# Patient Record
Sex: MALE | Race: WHITE | NOT HISPANIC OR LATINO | Employment: OTHER | URBAN - METROPOLITAN AREA
[De-identification: names, ages, dates, MRNs, and addresses within clinical notes are randomized per-mention and may not be internally consistent; named-entity substitution may affect disease eponyms.]

---

## 2017-04-17 ENCOUNTER — TRANSFERRED RECORDS (OUTPATIENT)
Dept: HEALTH INFORMATION MANAGEMENT | Facility: CLINIC | Age: 77
End: 2017-04-17

## 2017-04-19 ENCOUNTER — TRANSFERRED RECORDS (OUTPATIENT)
Dept: HEALTH INFORMATION MANAGEMENT | Facility: CLINIC | Age: 77
End: 2017-04-19

## 2017-05-08 DIAGNOSIS — D33.3 ACOUSTIC NEUROMA (H): Primary | ICD-10-CM

## 2017-05-09 ENCOUNTER — OFFICE VISIT (OUTPATIENT)
Dept: OTOLARYNGOLOGY | Facility: CLINIC | Age: 77
End: 2017-05-09

## 2017-05-09 ENCOUNTER — OFFICE VISIT (OUTPATIENT)
Dept: AUDIOLOGY | Facility: CLINIC | Age: 77
End: 2017-05-09
Attending: OTOLARYNGOLOGY

## 2017-05-09 VITALS — HEIGHT: 70 IN | BODY MASS INDEX: 28.77 KG/M2 | WEIGHT: 201 LBS

## 2017-05-09 DIAGNOSIS — D33.3 ACOUSTIC NEUROMA (H): Primary | ICD-10-CM

## 2017-05-09 DIAGNOSIS — D33.3 UNILATERAL VESTIBULAR SCHWANNOMA (H): Primary | ICD-10-CM

## 2017-05-09 DIAGNOSIS — H90.3 SENSORINEURAL HEARING LOSS, BILATERAL: Primary | ICD-10-CM

## 2017-05-09 RX ORDER — PREDNISONE 10 MG/1
TABLET ORAL
COMMUNITY
Start: 2017-03-27 | End: 2023-04-25

## 2017-05-09 RX ORDER — FLUTICASONE PROPIONATE 50 MCG
1 SPRAY, SUSPENSION (ML) NASAL
COMMUNITY
Start: 2016-02-03 | End: 2023-04-25

## 2017-05-09 RX ORDER — LISINOPRIL 5 MG/1
15 TABLET ORAL
COMMUNITY
Start: 2016-12-02 | End: 2023-04-25

## 2017-05-09 ASSESSMENT — PAIN SCALES - GENERAL: PAINLEVEL: NO PAIN (0)

## 2017-05-09 NOTE — PROGRESS NOTES
AUDIOLOGY REPORT    SUMMARY: Audiology visit completed. See audiogram for results.      RECOMMENDATIONS: Follow-up with ENT.    Christopher Seals  Licensed Audiologist  [place] License #[id number]

## 2017-05-09 NOTE — PATIENT INSTRUCTIONS
-Follow up in 1 year with MRI, audiogram and appointment with Dr. Starks and Dr. Becerra [facility].  -You have been provided clearance for a Bi-CROS hearing aid. Please schedule hearing aid consultation with our audiology team to further discuss.

## 2017-05-09 NOTE — MR AVS SNAPSHOT
After Visit Summary   5/9/2017    Ada Colvin    MRN: [id number]           Patient Information     Date Of Birth          1940        Visit Information        Provider Department      5/9/2017 2:35 PM Vonda Starks MD [facility]        Today's Diagnoses     Acoustic neuroma (H)    -  1      Care Instructions    -Follow up in 1 year with MRI, audiogram and appointment with Dr. Starks and Dr. Becerra in [facility].  -You have been provided clearance for a Bi-CROS hearing aid. Please schedule hearing aid consultation with our audiology team to further discuss.        Follow-ups after your visit        Additional Services     AUDIOLOGY ADULT REFERRAL       Your provider has referred you to:  audiology    Treatment:  Evaluation & Treatment  Specialty Testing:  Hearing Aid Consultation-Clearance for Bi-CROS hearing aids    Please be aware that coverage of these services is subject to the terms and limitations of your health insurance plan.  Call member services at your health plan with any benefit or coverage questions.      Please bring the following to your appointment:  >>   Any x-rays, CTs or MRIs which have been performed.  Contact the facility where they were done to arrange for  prior to your scheduled appointment.  Any new CT, MRI or other procedures ordered by your specialist must be performed at a [facility] facility or coordinated by your clinic's referral office.   >>   List of current medications  >>   This referral request   >>   Any documents/labs given to you for this referral                  Who to contact     Please call your clinic at [phone] to:    Ask questions about your health    Make or cancel appointments    Discuss your medicines    Learn about your test results    Speak to your doctor   If you have compliments or concerns about an experience at your clinic, or if you wish to file a complaint, please contact [facility]  "[place] [facility] Patient Relations at [phone] or email us at [email or web address]         Additional Information About Your Visit        MyChart Information     [facility] is an electronic gateway that provides easy, online access to your medical records. With [facility], you can request a clinic appointment, read your test results, renew a prescription or communicate with your care team.     To sign up for [facility] visit the website at [email or web address]   You will be asked to enter the access code listed below, as well as some personal information. Please follow the directions to create your username and password.     Your access code is: [email or web address]  Expires: 2017 11:54 AM     Your access code will  in 90 days. If you need help or a new code, please contact your [facility] or call [phone] for assistance.        Care EveryWhere ID     This is your Care EveryWhere ID. This could be used by other organizations to access your [facility] medical records  [id number]        Your Vitals Were     Height BMI (Body Mass Index)                1.778 m (5' 10\") 28.84 kg/m2           Blood Pressure from Last 3 Encounters:   14 134/74   13 (!) 167/95    Weight from Last 3 Encounters:   17 91.2 kg (201 lb)   14 91.4 kg (201 lb 9.6 oz)   13 86.2 kg (190 lb)              We Performed the Following     AUDIOLOGY ADULT REFERRAL        Primary Care Provider    Physician No Ref-Primary       No address on file        Thank you!     Thank you for choosing [facility]  for your care. Our goal is always to provide you with excellent care. Hearing back from our patients is one way we can continue to improve our services. Please take a few minutes to complete the written survey that you may receive in the mail after your visit with us. Thank you!             Your Updated Medication List - Protect others around you: Learn " how to safely use, store and throw away your medicines at www.disposemymeds.org.          This list is accurate as of: 5/9/17 11:59 PM.  Always use your most recent med list.                   Brand Name Dispense Instructions for use    ADVIL PO      Take 200 mg by mouth as needed       * aspirin 325 MG tablet      Take by mouth daily Reported on 5/9/2017       * aspirin  MG EC tablet      Take 325 mg by mouth       Blood Pressure Monitor Kit     1 kit    1 kit 2 times daily       fluticasone 50 MCG/ACT spray    FLONASE     Spray 1 spray in nostril       hydrochlorothiazide 25 MG tablet    HYDRODIURIL    30 tablet    Take 1 tablet (25 mg) by mouth daily       * lisinopril 5 MG tablet    PRINIVIL/ZESTRIL    30 tablet    Take 8 tablets (40 mg) by mouth At Bedtime       * lisinopril 5 MG tablet    PRINIVIL/ZESTRIL     Take 15 mg by mouth       predniSONE 10 MG tablet    DELTASONE         vitamin D3 1000 UNITS Caps      Take 1,000 Units by mouth       * Notice:  This list has 4 medication(s) that are the same as other medications prescribed for you. Read the directions carefully, and ask your doctor or other care provider to review them with you.

## 2017-05-09 NOTE — MR AVS SNAPSHOT
After Visit Summary   2017    Ada Colvin    MRN: [id number]           Patient Information     Date Of Birth          1940        Visit Information        Provider Department      2017 2:30 PM Michi Becerra MD [facility]        Care Instructions                  Follow-ups after your visit        Who to contact     Please call your clinic at [phone] to:    Ask questions about your health    Make or cancel appointments    Discuss your medicines    Learn about your test results    Speak to your doctor   If you have compliments or concerns about an experience at your clinic, or if you wish to file a complaint, please contact [facility] Patient Relations at [phone] or email us at [email or web address]         Additional Information About Your Visit        MyChart Information     [employer] is an electronic gateway that provides easy, online access to your medical records. With [employer], you can request a clinic appointment, read your test results, renew a prescription or communicate with your care team.     To sign up for [employer] visit the website at [email or web address]   You will be asked to enter the access code listed below, as well as some personal information. Please follow the directions to create your username and password.     Your access code is: [email or web address]  Expires: 2017 11:54 AM     Your access code will  in 90 days. If you need help or a new code, please contact your [facility] or call [phone] for assistance.        Care EveryWhere ID     This is your Care EveryWhere ID. This could be used by other organizations to access your [facility] medical records  [id number]         Blood Pressure from Last 3 Encounters:   14 134/74   13 (!) 167/95    Weight from Last 3 Encounters:   17 91.2 kg (201 lb)   14 91.4 kg (201 lb 9.6 oz)    12/25/13 86.2 kg (190 lb)              Today, you had the following     No orders found for display       Primary Care Provider    Physician No Ref-Primary       No address on file        Thank you!     Thank you for choosing [facility]  for your care. Our goal is always to provide you with excellent care. Hearing back from our patients is one way we can continue to improve our services. Please take a few minutes to complete the written survey that you may receive in the mail after your visit with us. Thank you!             Your Updated Medication List - Protect others around you: Learn how to safely use, store and throw away your medicines at www.disposemymeds.org.          This list is accurate as of: 5/9/17  3:10 PM.  Always use your most recent med list.                   Brand Name Dispense Instructions for use    ADVIL PO      Take 200 mg by mouth as needed       * aspirin 325 MG tablet      Take by mouth daily Reported on 5/9/2017       * aspirin  MG EC tablet      Take 325 mg by mouth       Blood Pressure Monitor Kit     1 kit    1 kit 2 times daily       fluticasone 50 MCG/ACT spray    FLONASE     Spray 1 spray in nostril       hydrochlorothiazide 25 MG tablet    HYDRODIURIL    30 tablet    Take 1 tablet (25 mg) by mouth daily       * lisinopril 5 MG tablet    PRINIVIL/ZESTRIL    30 tablet    Take 8 tablets (40 mg) by mouth At Bedtime       * lisinopril 5 MG tablet    PRINIVIL/ZESTRIL     Take 15 mg by mouth       predniSONE 10 MG tablet    DELTASONE         vitamin D3 1000 UNITS Caps      Take 1,000 Units by mouth       * Notice:  This list has 4 medication(s) that are the same as other medications prescribed for you. Read the directions carefully, and ask your doctor or other care provider to review them with you.

## 2017-05-09 NOTE — LETTER
2017       RE: Luan Kang  [address] [place] [address]     Dear Colleague,    Thank you for referring your patient, Luan Kang, to the [facility] TH[facility] at [facility]. Please see a copy of my visit note below.    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  A detailed summary of today's visit has been entered into the record by our fellow, Gaye Escamilla, which I have reviewed, amended and incorporated into my own.      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  He experienced hearing loss in essentially 2 steps and since the second decline, really has not had useful hearing in the left ear.      He is not having significant balance difficulties and plays tennis regularly.  He has hypertension, which is treated, and is otherwise healthy.      His neurologic examination shows no evidence of facial dysfunction or sensory loss.      His MRI shows a lesion that fills the internal auditory canal and extends into the cerebellopontine angle to touch the surface of the brainstem but not distort it significantly.  The lesion is multicystic.      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      RECOMMENDATIONS:  We discussed the nature of vestibular schwannoma and the various options, including watchful waiting, stereotactic radiosurgery and microsurgical excision.  At this point in time, he clearly favors watchful waiting.  We have recommended that he return in one year with an audiogram and scan at that time.         RHYS CORTES MD             D: 2017 15:13   T: 05/10/2017 10:31   MT: DEE      Name:     LUAN KANG   MRN:      [id number]        Account:      [id number]   :      1940           Service Date: 2017      Document: [id number]

## 2017-05-09 NOTE — LETTER
5/9/2017     RE: Ada Colvin  [address] [address]     Dear Colleague,    Thank you for referring your patient, Ada Colvin, to the [facility] EAR NOSE [facility] THROAT at [facility]. Please see a copy of my visit note below.    Dennys Cortez MD   [facility]   [address], [address]   [place]   [address]       Dear Dr. Cortez,    We had the pleasure of seeing Mr. Colvin at the [facility] in consultation at your request for a newly diagnosed left internal auditory canal and cerebellopontine angle lesion.  The patient was seen in conjunction with Dr. Michi Becerra of the Neurosurgery Department.      HISTORY OF PRESENT ILLNESS:  Mr. 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  The patient reports that he initially noted some hearing loss approximately two years ago.  It was gradual in onset.  He developed tinnitus initially and then noticed a decline in his hearing.  He felt that the hearing was down on both sides and over six to eight months ago, he noted that the hearing had gotten worse.  He sought evaluation and in December of 2016 ended up buying bilateral hearing aids.  He felt that his right ear was actually his better hearing ear and did not get much benefit from the hearing aid on the left side.  Earlier this spring, he felt that his hearing was significantly declining on the lefth side.  He sought care with an ear, nose and throat physician and was treated with a course of prednisone which did not improve his loss of hearing.  He was noted to have an asymmetric hearing loss and an MRI was ordered which demonstrated a new lesion that was enhancing in the left internal auditory canal and cerebellopontine angle.  The patient denies any issues with balance or dizziness.   He plays tennis regularly and has not had any difficulty from this perspective.        The patient denies any facial numbness or tingling.  He has no history of chronic headaches or any other complications.      PAST MEDICAL HISTORY:  Hypertension which is currently controlled on his current medications.      PAST SURGICAL HISTORY:  Hip replacement.      MEDICATIONS:  Vitamin D, Flonase, lisinopril.      ALLERGIES:  No known drug allergies.      FAMILY HISTORY:  He denies any significant chronic medical problems in his family.      SOCIAL HISTORY:  The patient previously smoked.  He quit approximately 10 years ago.  He reports some social alcoholic intake.  He currently works as a professor at the [employer].  He is potentially anticipating longterm sometime in the next year.  He is .  He is quite active and plays tennis regularly.      REVIEW OF SYSTEMS:  A 12-point review of systems was performed.  Those positive are indicated in the HPI.  The remainder are negative.      PHYSICAL EXAMINATION:  In general, the patient is well-appearing and in no acute distress.  He is communicating appropriately.  His face is symmetric at rest and with function.  House-Brackmann I out of VI bilaterally.  His extraocular movements are intact.  He has no spontaneous or gaze-evoked nystagmus.  His external ears are normal.  The external auditory canals have bilateral exostosis but a portion of the tympanic membrane is visible and appears normal, intact and without middle ear effusion.  His oral cavity, oropharynx does not demonstrate any oral mucosal lesions.  The palate elevates symmetrically.  His tongue is midline and mobile.  He has normal shoulder shrug.  His facial sensation is intact.  He has normal finger-to-nose testing.  His gait is normal.      AUDIOGRAM:  An audiogram was performed and reviewed with the patient.  This demonstrates on the right ear that he has normal pure tone thresholds that slope to  a moderate sensorineural hearing loss.  He has a speech reception threshold (SRT) of 20 dB and a word recognition score of 96%.  He has normal tympanogram on the right side.  On the left, he has moderately severe rising to mild sloping to severe sensorineural hearing loss with a speech reception threshold (SRT) of 70 dB and a word recognition score of 12%.  His tympanogram is normal.  He has a present right ipsilateral reflex and the remainder are absent.      IMAGING:  MRI of the internal auditory canals was obtained and reviewed with the patient.  This demonstrates an enhancing lesion within the left internal auditory canal extending out into the cerebellopontine angle approximately 1.5 cm with several cystic components within it.  It does not enhance without contrast.  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  There is no contact with or compression of the brainstem.      IMPRESSION AND PLAN:  Mr. 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  We discussed the course of these tumors.  We reviewed with him that these are often benign tumors that grow slowly.  We reviewed with him that often patients present with hearing loss.  The current hearing loss that he has cannot be recovered regardless of the different management options.      We  discussed management options with him including watchful waiting, stereotactic radiosurgery including gamma knife and surgical intervention.  In regards to watchful waiting, we reviewed with him that often we would have patients return annually with a repeat MRI and audiogram with plans to return sooner should he have worsening symptoms of further hearing loss, onset of imbalance or dizziness or severe headache.  We then discussed stereotactic radiosurgery.  We reviewed that patients often have a quick recovery and it is a one day  procedure.  However, the goal of the intervention is not to remove the tumor but rather to prevent any additional growth from this.  We discussed with him that he may have symptoms or side effects, which we detailed, that develop several months after the initial treatment.  We reviewed the surgical intervention with him.  Given the extent of his hearing loss and the size of the lesion, we would recommend a translabyrinthine approach.  We reviewed with him that this would require a several day stay in the hospital as well as a longer recovery from the procedure, and we detailed the steps of the operation and the common and serious risks attendant with it.  He had a number of questions which were answered.  At this point, as hearing loss is his only current symptom, he would like to continue to closely monitor and proceed with watchful waiting.  We will set him up for follow-up with us in one years' time with an MRI and repeat audiogram.  Again, we reviewed with him certain symptoms should they worsen or new ones develop that we would like him come back and return to see us sooner to discuss additional options.      For his hearing, we discussed Bi-CROS and BAHA today, and encouraged him to consider a Bi-CROS trial while he is pursuing watchful waiting (active surveillance) for his schwannoma.    Gaye Escamilla MD   Neurotology Fellow     Thank you for the opportunity to participate in his care.    Sincerely,    Vonda Starks MD  Otology & Neurotology  [facility]      I, Vonda Starks, saw this patient with the resident/fellow and agree with the resident's findings and plan of care as documented in the resident's/fellow's note.         cc: Dennys Cortez MD    [facility]    [address] [place] [address], [address]    [place] [address]      EK/ms

## 2017-05-09 NOTE — PROGRESS NOTES
Dennys Cortez MD   [facility]   [address] [place] [address], [address]   [place] [address]       Dear Dr. Cortez,    We had the pleasure of seeing Mr. 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  The patient was seen in conjunction with Dr. Michi Becerra of the Neurosurgery Department.      HISTORY OF PRESENT ILLNESS:  Mr. 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  The patient reports that he initially noted some hearing loss approximately two years ago.  It was gradual in onset.  He developed tinnitus initially and then noticed a decline in his hearing.  He felt that the hearing was down on both sides and over six to eight months ago, he noted that the hearing had gotten worse.  He sought evaluation and in December of 2016 ended up buying bilateral hearing aids.  He felt that his right ear was actually his better hearing ear and did not get much benefit from the hearing aid on the left side.  Earlier this spring, he felt that his hearing was significantly declining on the lefth side.  He sought care with an ear, nose and throat physician and was treated with a course of prednisone which did not improve his loss of hearing.  He was noted to have an asymmetric hearing loss and an MRI was ordered which demonstrated a new lesion that was enhancing in the left internal auditory canal and cerebellopontine angle.  The patient denies any issues with balance or dizziness.  He plays tennis regularly and has not had any difficulty from this perspective.        The patient denies any facial numbness or tingling.  He has no history of chronic headaches or any other complications.      PAST MEDICAL HISTORY:  Hypertension which is currently controlled on  his current medications.      PAST SURGICAL HISTORY:  Hip replacement.      MEDICATIONS:  Vitamin D, Flonase, lisinopril.      ALLERGIES:  No known drug allergies.      FAMILY HISTORY:  He denies any significant chronic medical problems in his family.      SOCIAL HISTORY:  The patient previously smoked.  He quit approximately 10 years ago.  He reports some social alcoholic intake.  He currently works as a professor at the [employer].  He is potentially anticipating prison sometime in the next year.  He is .  He is quite active and plays tennis regularly.      REVIEW OF SYSTEMS:  A 12-point review of systems was performed.  Those positive are indicated in the HPI.  The remainder are negative.      PHYSICAL EXAMINATION:  In general, the patient is well-appearing and in no acute distress.  He is communicating appropriately.  His face is symmetric at rest and with function.  House-Brackmann I out of VI bilaterally.  His extraocular movements are intact.  He has no spontaneous or gaze-evoked nystagmus.  His external ears are normal.  The external auditory canals have bilateral exostosis but a portion of the tympanic membrane is visible and appears normal, intact and without middle ear effusion.  His oral cavity, oropharynx does not demonstrate any oral mucosal lesions.  The palate elevates symmetrically.  His tongue is midline and mobile.  He has normal shoulder shrug.  His facial sensation is intact.  He has normal finger-to-nose testing.  His gait is normal.      AUDIOGRAM:  An audiogram was performed and reviewed with the patient.  This demonstrates on the right ear that he has normal pure tone thresholds that slope to a moderate sensorineural hearing loss.  He has a speech reception threshold (SRT) of 20 dB and a word recognition score of 96%.  He has normal tympanogram on the right side.  On the left, he has moderately severe rising to mild sloping to severe sensorineural hearing loss with a  speech reception threshold (SRT) of 70 dB and a word recognition score of 12%.  His tympanogram is normal.  He has a present right ipsilateral reflex and the remainder are absent.      IMAGING:  MRI of the internal auditory canals was obtained and reviewed with the patient.  This demonstrates an enhancing lesion within the left internal auditory canal extending out into the cerebellopontine angle approximately 1.5 cm with several cystic components within it.  It does not enhance without contrast.  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  There is no contact with or compression of the brainstem.      IMPRESSION AND PLAN:  Mr. 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  We discussed the course of these tumors.  We reviewed with him that these are often benign tumors that grow slowly.  We reviewed with him that often patients present with hearing loss.  The current hearing loss that he has cannot be recovered regardless of the different management options.      We  discussed management options with him including watchful waiting, stereotactic radiosurgery including gamma knife and surgical intervention.  In regards to watchful waiting, we reviewed with him that often we would have patients return annually with a repeat MRI and audiogram with plans to return sooner should he have worsening symptoms of further hearing loss, onset of imbalance or dizziness or severe headache.  We then discussed stereotactic radiosurgery.  We reviewed that patients often have a quick recovery and it is a one day procedure.  However, the goal of the intervention is not to remove the tumor but rather to prevent any additional growth from this.  We discussed with him that he may have symptoms or side effects, which we detailed, that develop several months after the initial treatment.  We reviewed  the surgical intervention with him.  Given the extent of his hearing loss and the size of the lesion, we would recommend a translabyrinthine approach.  We reviewed with him that this would require a several day stay in the hospital as well as a longer recovery from the procedure, and we detailed the steps of the operation and the common and serious risks attendant with it.  He had a number of questions which were answered.  At this point, as hearing loss is his only current symptom, he would like to continue to closely monitor and proceed with watchful waiting.  We will set him up for follow-up with us in one years' time with an MRI and repeat audiogram.  Again, we reviewed with him certain symptoms should they worsen or new ones develop that we would like him come back and return to see us sooner to discuss additional options.      For his hearing, we discussed Bi-CROS and BAHA today, and encouraged him to consider a Bi-CROS trial while he is pursuing watchful waiting (active surveillance) for his schwannoma.    Gaye Escamilla MD   Neurotology Fellow     Thank you for the opportunity to participate in his care.    Sincerely,    Vonda Starks MD  Otology & Neurotology  [facility]      I, Vonda Starks, saw this patient with the resident/fellow and agree with the resident's findings and plan of care as documented in the resident's/fellow's note.         cc: Dennys Cortez MD    [facility]    [address] [place] [address], [address]    [place]   [address]      EK/ms

## 2017-05-09 NOTE — NURSING NOTE
Chief Complaint   Patient presents with     Consult     new patient     Brittany Cano Medical Assistant

## 2017-05-09 NOTE — MR AVS SNAPSHOT
After Visit Summary   2017    Ada Colvin    MRN: [id number]           Patient Information     Date Of Birth          1940        Visit Information        Provider Department      2017 11:00 AM Mirian Parr, [facility]        Today's Diagnoses     Sensorineural hearing loss, bilateral    -  1       Follow-ups after your visit        Who to contact     Please call your clinic at [phone] to:    Ask questions about your health    Make or cancel appointments    Discuss your medicines    Learn about your test results    Speak to your doctor   If you have compliments or concerns about an experience at your clinic, or if you wish to file a complaint, please contact [facility] Patient Relations at [phone] or email us at [email or web address]         Additional Information About Your Visit        MyChart Information     [employer] is an electronic gateway that provides easy, online access to your medical records. With [employer], you can request a clinic appointment, read your test results, renew a prescription or communicate with your care team.     To sign up for [employer] visit the website at [email or web address]   You will be asked to enter the access code listed below, as well as some personal information. Please follow the directions to create your username and password.     Your access code is: [email or web address]  Expires: 2017 11:54 AM     Your access code will  in 90 days. If you need help or a new code, please contact your [facility] or call [phone] for assistance.        Care EveryWhere ID     This is your Care EveryWhere ID. This could be used by other organizations to access your [facility] medical records  [id number]         Blood Pressure from Last 3 Encounters:   14 134/74   13 (!) 167/95    Weight from Last 3 Encounters:   17 91.2 kg (201 lb)   14 91.4 kg  (201 lb 9.6 oz)   12/25/13 86.2 kg (190 lb)              We Performed the Following     AUDIOGRAM/TYMPANOGRAM - INTERFACE     [facility] Audiometry Thrshld Eval & Speech Recog (40758)     Tymps / Reflex   (03881)        Primary Care Provider    Physician No Ref-Primary       No address on file        Thank you!     Thank you for choosing [facility] AUDIOLOGY  for your care. Our goal is always to provide you with excellent care. Hearing back from our patients is one way we can continue to improve our services. Please take a few minutes to complete the written survey that you may receive in the mail after your visit with us. Thank you!             Your Updated Medication List - Protect others around you: Learn how to safely use, store and throw away your medicines at www.disposemymeds.org.          This list is accurate as of: 5/9/17  3:33 PM.  Always use your most recent med list.                   Brand Name Dispense Instructions for use    ADVIL PO      Take 200 mg by mouth as needed       * aspirin 325 MG tablet      Take by mouth daily Reported on 5/9/2017       * aspirin  MG EC tablet      Take 325 mg by mouth       Blood Pressure Monitor Kit     1 kit    1 kit 2 times daily       fluticasone 50 MCG/ACT spray    FLONASE     Spray 1 spray in nostril       hydrochlorothiazide 25 MG tablet    HYDRODIURIL    30 tablet    Take 1 tablet (25 mg) by mouth daily       * lisinopril 5 MG tablet    PRINIVIL/ZESTRIL    30 tablet    Take 8 tablets (40 mg) by mouth At Bedtime       * lisinopril 5 MG tablet    PRINIVIL/ZESTRIL     Take 15 mg by mouth       predniSONE 10 MG tablet    DELTASONE         vitamin D3 1000 UNITS Caps      Take 1,000 Units by mouth       * Notice:  This list has 4 medication(s) that are the same as other medications prescribed for you. Read the directions carefully, and ask your doctor or other care provider to review them with you.

## 2017-05-10 NOTE — PROGRESS NOTES
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  A detailed summary of today's visit has been entered into the record by our fellow, Gaye Escamilla, which I have reviewed, amended and incorporated into my own.      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  He experienced hearing loss in essentially 2 steps and since the second decline, really has not had useful hearing in the left ear.      He is not having significant balance difficulties and plays tennis regularly.  He has hypertension, which is treated, and is otherwise healthy.      His neurologic examination shows no evidence of facial dysfunction or sensory loss.      His MRI shows a lesion that fills the internal auditory canal and extends into the cerebellopontine angle to touch the surface of the brainstem but not distort it significantly.  The lesion is multicystic.      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      RECOMMENDATIONS:  We discussed the nature of vestibular schwannoma and the various options, including watchful waiting, stereotactic radiosurgery and microsurgical excision.  At this point in time, he clearly favors watchful waiting.  We have recommended that he return in one year with an audiogram and scan at that time.         RHYS CORTES MD             D: 2017 15:13   T: 05/10/2017 10:31   MT: DEE      Name:     LUAN KANG   MRN:      [id number]        Account:      [id number]   :      1940           Service Date: 2017      Document: [id number]

## 2017-05-31 PROBLEM — D33.3 UNILATERAL VESTIBULAR SCHWANNOMA (H): Status: ACTIVE | Noted: 2017-05-31

## 2018-10-02 DIAGNOSIS — D33.3 VESTIBULAR SCHWANNOMA (H): Primary | ICD-10-CM

## 2018-10-19 ENCOUNTER — RADIANT APPOINTMENT (OUTPATIENT)
Dept: MRI IMAGING | Facility: CLINIC | Age: 78
End: 2018-10-19
Attending: OTOLARYNGOLOGY
Payer: COMMERCIAL

## 2018-10-19 DIAGNOSIS — D33.3 VESTIBULAR SCHWANNOMA (H): ICD-10-CM

## 2018-10-19 RX ORDER — GADOBUTROL 604.72 MG/ML
10 INJECTION INTRAVENOUS ONCE
Status: COMPLETED | OUTPATIENT
Start: 2018-10-19 | End: 2018-10-19

## 2018-10-19 RX ADMIN — GADOBUTROL 9 ML: 604.72 INJECTION INTRAVENOUS at 08:41

## 2018-10-19 NOTE — DISCHARGE INSTRUCTIONS
MRI Contrast Discharge Instructions    The IV contrast you received today will pass out of your body in your  urine. This will happen in the next 24 hours. You will not feel this process.  Your urine will not change color.    Drink at least 4 extra glasses of water or juice today (unless your doctor  has restricted your fluids). This reduces the stress on your kidneys.  You may take your regular medicines.    If you are on dialysis: It is best to have dialysis today.    If you have a reaction: Most reactions happen right away. If you have  any new symptoms after leaving the hospital (such as hives or swelling),  call your hospital at the correct number below. Or call your family doctor.  If you have breathing distress or wheezing, call 911.    Special instructions: ***    I have read and understand the above information.    Signature:______________________________________ Date:___________    Staff:__________________________________________ Date:___________     Time:__________    [place] [facility] Departments:    ___[facility]: [phone]  ___[facility]: [phone]  ___[place]: [phone] ___[facility]: [phone]  ___[facility]: [phone]  ___[facility]: [phone]  ___[phone]  ___[facility]: [phone]  ___[phone]

## 2018-10-30 ENCOUNTER — OFFICE VISIT (OUTPATIENT)
Dept: AUDIOLOGY | Facility: CLINIC | Age: 78
End: 2018-10-30
Attending: OTOLARYNGOLOGY
Payer: COMMERCIAL

## 2018-10-30 ENCOUNTER — OFFICE VISIT (OUTPATIENT)
Dept: OTOLARYNGOLOGY | Facility: CLINIC | Age: 78
End: 2018-10-30
Payer: COMMERCIAL

## 2018-10-30 VITALS — WEIGHT: 197 LBS | BODY MASS INDEX: 28.2 KG/M2 | HEIGHT: 70 IN

## 2018-10-30 DIAGNOSIS — H90.3 SENSORY HEARING LOSS, BILATERAL: Primary | ICD-10-CM

## 2018-10-30 DIAGNOSIS — D33.3 VESTIBULAR SCHWANNOMA (H): Primary | ICD-10-CM

## 2018-10-30 ASSESSMENT — PAIN SCALES - GENERAL: PAINLEVEL: NO PAIN (0)

## 2018-10-30 NOTE — PATIENT INSTRUCTIONS
1. Please follow-up in clinic in 6 months with an MRI and audiogram.   2. Please call the ENT clinic with any questions,concerns, new or worsening symptoms.    -Clinic number is [phone]   - [facility]'s direct line (Dr. Starks and Dr. Becerra' nurse) [phone]

## 2018-10-30 NOTE — PROGRESS NOTES
HISTORY OF PRESENT ILLNESS:     Mr. Colvin is a 76-year-old gentleman here for 1 year follow-up of left IAC and CPA lesion. Patient does not report balance or vestibular symptoms. He reports his hearing loss is stable with left ear worse than right. He does have problems localizing sound. He wears binaural hearing aids. He denies dizziness, balance problems, facial weakness, pain, or numbness of the face.     PAST MEDICAL HISTORY:  Hypertension which is currently controlled on his current medications.       PAST SURGICAL HISTORY:  Hip replacement.       MEDICATIONS:  Vitamin D, Flonase, lisinopril.       ALLERGIES:  No known drug allergies.       FAMILY HISTORY:  He denies any significant chronic medical problems in his family.       SOCIAL HISTORY:  The patient previously smoked.  He quit approximately 10 years ago.  He reports some social alcoholic intake.  He currently works as a professor at the [employer].  He is potentially anticipating shelter sometime in the next year.  He is .  He is quite active and plays tennis regularly.       REVIEW OF SYSTEMS:  A 12-point review of systems was performed.  Those positive are indicated in the HPI.  The remainder are negative.       PHYSICAL EXAMINATION:  General: patient is well-appearing and in no acute distress.  He is communicating appropriately.  His face is symmetric at rest and with function.  House-Brackmann I out of VI bilaterally.  His extraocular movements are intact.  He has no spontaneous or gaze-evoked nystagmus. His gait is normal.       AUDIOGRAM:  Results: Right normal sloping to moderately-severe, rising to mild SNHL. Left asymmetric moderately-severe to severe SNHL. Re: 5/2017  results, left ear 15 dB improvement from 0.25-0.5 kHz, and decrease by 10-15 dB 3-4 kHz. Right ear worsened by 15-35 dB 1.5-3 kHz, and  15 dB improvement at 8 kHz. Tymps: restricted eardrum mobility bilaterally. Reflexes: Right ipsi present at normal  level, and all other  conditions absent. SRTs align with best thresholds. Asymmetric WRS -stable.      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  The solid components are not necessarily larger.  No herniation or Dr. St reviewed the scans as well.  Hydrocephalus.       IMPRESSION AND PLAN:    1. 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  His preference is to avoid surgical therapy.  We reviewed with him symptoms should they worsen or new ones develop that we would like him come back and return to see us sooner to discuss additional options.  Dr. Becerra also reviewed the scans remotely during the clinic today, and concurred that it is reasonable to continue to follow-up closely.  We both feel that intervention may become necessary if the cyst continue to enlarge, and at present we do not have a reliable way to drain the cysts without any invasive procedure without risk of recurrence.    - Follow-up in 6 months for MRI to monitor growth and compression of adjacent structures. Follow-up sooner with balance symptoms or other concerns.  - For his hearing he will continue to wear hearing aids    I, Vonda Starks, was present with the medical student who participated in the service and in the documentation of the note.  I have verified the history and personally performed the physical exam and medical decision making.  I agree with the assessment and plan of care as documented in the note.

## 2018-10-30 NOTE — MR AVS SNAPSHOT
"              After Visit Summary   10/30/2018    Ada Colvin    MRN: [id number]           Patient Information     Date Of Birth          1940        Visit Information        Provider Department      10/30/2018 3:00 PM Vonda Starks MD [facility] Ear Nose [facility] Throat        Today's Diagnoses     Vestibular schwannoma (H)    -  1      Care Instructions    1. Please follow-up in clinic in 6 months with an MRI and audiogram.   2. Please call the ENT clinic with any questions,concerns, new or worsening symptoms.    -Clinic number is [phone]   - [facility]'s direct line (Dr. Starks and Dr. Becerra' nurse) [phone]              Follow-ups after your visit        Who to contact     Please call your clinic at [phone] to:    Ask questions about your health    Make or cancel appointments    Discuss your medicines    Learn about your test results    Speak to your doctor            Additional Information About Your Visit        MyChart Information     [employer] is an electronic gateway that provides easy, online access to your medical records. With [employer], you can request a clinic appointment, read your test results, renew a prescription or communicate with your care team.     To sign up for [employer] visit the website at [email or web address]   You will be asked to enter the access code listed below, as well as some personal information. Please follow the directions to create your username and password.     Your access code is: [email or web address]  Expires: 2019  8:12 AM     Your access code will  in 90 days. If you need help or a new code, please contact your [facility] or call [phone] for assistance.        Care EveryWhere ID     This is your Care EveryWhere ID. This could be used by other organizations to access your [facility] medical records  [id number]        Your Vitals Were     Height BMI (Body Mass Index)                1.78 m (5' 10.08\") 28.2 kg/m2     "       Blood Pressure from Last 3 Encounters:   07/02/14 134/74   12/25/13 (!) 167/95    Weight from Last 3 Encounters:   10/30/18 89.4 kg (197 lb)   05/09/17 91.2 kg (201 lb)   07/02/14 91.4 kg (201 lb 9.6 oz)               Primary Care Provider Fax #    Physician No Ref-Primary [phone]       No address on file        Equal Access to Services     BREE FERNANDES : Hadii mayur ku hadasho Soomaali, waaxda luqadaha, qaybta kaalmada adeegyada, waxcandelario carolin hanyn claudia markell jane . So [facility] [phone].    ATENCIÓN: Si habla español, tiene a melgar disposición servicios gratuitos de asistencia lingüística. Llame al [phone].    We comply with applicable federal civil rights laws and [place] laws. We do not discriminate on the basis of race, color, national origin, age, disability, sex, sexual orientation, or gender identity.            Thank you!     Thank you for choosing [facility] TH[facility]  for your care. Our goal is always to provide you with excellent care. Hearing back from our patients is one way we can continue to improve our services. Please take a few minutes to complete the written survey that you may receive in the mail after your visit with us. Thank you!             Your Updated Medication List - Protect others around you: Learn how to safely use, store and throw away your medicines at www.disposemymeds.org.          This list is accurate as of 10/30/18 11:59 PM.  Always use your most recent med list.                   Brand Name Dispense Instructions for use Diagnosis    ADVIL PO      Take 200 mg by mouth as needed        * aspirin 325 MG tablet      Take by mouth daily Reported on 5/9/2017        * aspirin 325 MG EC tablet      Take 325 mg by mouth        Blood Pressure Monitor Kit     1 kit    1 kit 2 times daily    Hypertension       fluticasone 50 MCG/ACT spray    FLONASE     Spray 1 spray in nostril        hydrochlorothiazide 25 MG tablet    HYDRODIURIL    30 tablet    Take 1 tablet  (25 mg) by mouth daily    Hypertension       * lisinopril 5 MG tablet    PRINIVIL/ZESTRIL    30 tablet    Take 8 tablets (40 mg) by mouth At Bedtime    Hypertension       * lisinopril 5 MG tablet    PRINIVIL/ZESTRIL     Take 15 mg by mouth        predniSONE 10 MG tablet    DELTASONE          vitamin D3 1000 units Caps      Take 1,000 Units by mouth        * Notice:  This list has 4 medication(s) that are the same as other medications prescribed for you. Read the directions carefully, and ask your doctor or other care provider to review them with you.

## 2018-10-30 NOTE — MR AVS SNAPSHOT
After Visit Summary   10/30/2018    Ada Colvin    MRN: [id number]           Patient Information     Date Of Birth          1940        Visit Information        Provider Department      10/30/2018 2:00 PM Shayy Ramos [facility] Audiology        Today's Diagnoses     Sensory hearing loss, bilateral    -  1       Follow-ups after your visit        Who to contact     Please call your clinic at [phone] to:    Ask questions about your health    Make or cancel appointments    Discuss your medicines    Learn about your test results    Speak to your doctor            Additional Information About Your Visit        MyChart Information     [employer] is an electronic gateway that provides easy, online access to your medical records. With [employer], you can request a clinic appointment, read your test results, renew a prescription or communicate with your care team.     To sign up for [employer] visit the website at [email or web address]   You will be asked to enter the access code listed below, as well as some personal information. Please follow the directions to create your username and password.     Your access code is: [email or web address]  Expires: 2019  9:12 AM     Your access code will  in 90 days. If you need help or a new code, please contact your [facility] or call [phone] for assistance.        Care EveryWhere ID     This is your Care EveryWhere ID. This could be used by other organizations to access your [facility] medical records  [id number]         Blood Pressure from Last 3 Encounters:   14 134/74   13 (!) 167/95    Weight from Last 3 Encounters:   10/30/18 89.4 kg (197 lb)   17 91.2 kg (201 lb)   14 91.4 kg (201 lb 9.6 oz)              We Performed the Following     AUDIOGRAM/TYMPANOGRAM - INTERFACE     [facility] Thrshld Eval & Speech Recog ([id number])     Tymps / Reflex   ([id number])        Primary Care Provider Fax #     Physician No Ref-Primary [phone]       No address on file        Equal Access to Services     JUSTINOFIORDALIZA DOM : Hadii mayur nicole cayden Dubon, wajasperda lusocratestetoha, qatracieta kaliberty amyge, emmanuel loera hanypierre reyesliz guillaume jane garcia. So [facility] [phone].    ATENCIÓN: Si habla español, tiene a melgar disposición servicios gratuitos de asistencia lingüística. Llame al [phone].    We comply with applicable federal civil rights laws and [place] laws. We do not discriminate on the basis of race, color, national origin, age, disability, sex, sexual orientation, or gender identity.            Thank you!     Thank you for choosing [facility] AUDIOLOGY  for your care. Our goal is always to provide you with excellent care. Hearing back from our patients is one way we can continue to improve our services. Please take a few minutes to complete the written survey that you may receive in the mail after your visit with us. Thank you!             Your Updated Medication List - Protect others around you: Learn how to safely use, store and throw away your medicines at www.disposemymeds.org.          This list is accurate as of 10/30/18  3:10 PM.  Always use your most recent med list.                   Brand Name Dispense Instructions for use Diagnosis    ADVIL PO      Take 200 mg by mouth as needed        * aspirin 325 MG tablet      Take by mouth daily Reported on 5/9/2017        * aspirin 325 MG EC tablet      Take 325 mg by mouth        Blood Pressure Monitor Kit     1 kit    1 kit 2 times daily    Hypertension       fluticasone 50 MCG/ACT spray    FLONASE     Spray 1 spray in nostril        hydrochlorothiazide 25 MG tablet    HYDRODIURIL    30 tablet    Take 1 tablet (25 mg) by mouth daily    Hypertension       * lisinopril 5 MG tablet    PRINIVIL/ZESTRIL    30 tablet    Take 8 tablets (40 mg) by mouth At Bedtime    Hypertension       * lisinopril 5 MG tablet    PRINIVIL/ZESTRIL     Take 15 mg by mouth        predniSONE 10 MG  tablet    DELTASONE          vitamin D3 1000 units Caps      Take 1,000 Units by mouth        * Notice:  This list has 4 medication(s) that are the same as other medications prescribed for you. Read the directions carefully, and ask your doctor or other care provider to review them with you.

## 2018-10-30 NOTE — LETTER
10/30/2018       RE: Ada Colvin  [address] [place] [facility] [place] [facility] [address]     Dear Colleague,    Thank you for referring your patient, Ada Colvin, to the [facility] E[facility]. Please see a copy of my visit note below.    HISTORY OF PRESENT ILLNESS:     Mr. Colvin is a 76-year-old gentleman here for 1 year follow-up of left IAC and CPA lesion. Patient does not report balance or vestibular symptoms. He reports his hearing loss is stable with left ear worse than right. He does have problems localizing sound. He wears binaural hearing aids. He denies dizziness, balance problems, facial weakness, pain, or numbness of the face.     PAST MEDICAL HISTORY:  Hypertension which is currently controlled on his current medications.       PAST SURGICAL HISTORY:  Hip replacement.       MEDICATIONS:  Vitamin D, Flonase, lisinopril.       ALLERGIES:  No known drug allergies.       FAMILY HISTORY:  He denies any significant chronic medical problems in his family.       SOCIAL HISTORY:  The patient previously smoked.  He quit approximately 10 years ago.  He reports some social alcoholic intake.  He currently works as a professor at the [employer].  He is potentially anticipating correction sometime in the next year.  He is .  He is quite active and plays tennis regularly.       REVIEW OF SYSTEMS:  A 12-point review of systems was performed.  Those positive are indicated in the HPI.  The remainder are negative.       PHYSICAL EXAMINATION:  General: patient is well-appearing and in no acute distress.  He is communicating appropriately.  His face is symmetric at rest and with function.  House-Brackmann I out of VI bilaterally.  His extraocular movements are intact.  He has no spontaneous or gaze-evoked nystagmus. His gait is normal.       AUDIOGRAM:  Results: Right normal sloping to moderately-severe, rising to mild SNHL. Left asymmetric  moderately-severe to severe SNHL. Re: 5/2017  results, left ear 15 dB improvement from 0.25-0.5 kHz, and decrease by 10-15 dB 3-4 kHz. Right ear worsened by 15-35 dB 1.5-3 kHz, and  15 dB improvement at 8 kHz. Tymps: restricted eardrum mobility bilaterally. Reflexes: Right ipsi present at normal level, and all other  conditions absent. SRTs align with best thresholds. Asymmetric WRS -stable.      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  The solid components are not necessarily larger.  No herniation or Dr. St reviewed the scans as well.  Hydrocephalus.       IMPRESSION AND PLAN:    1. 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  His preference is to avoid surgical therapy.  We reviewed with him symptoms should they worsen or new ones develop that we would like him come back and return to see us sooner to discuss additional options.  Dr. Becerra also reviewed the scans remotely during the clinic today, and concurred that it is reasonable to continue to follow-up closely.  We both feel that intervention may become necessary if the cyst continue to enlarge, and at present we do not have a reliable way to drain the cysts without any invasive procedure without risk of recurrence.    - Follow-up in 6 months for MRI to monitor growth and compression of adjacent structures. Follow-up sooner with balance symptoms or other concerns.  - For his hearing he will continue to wear hearing aids    I, Vonda Starks, was present with the medical student who participated in the service and in the documentation of the note.  I have verified the history and personally performed the physical exam and medical decision making.  I agree with the  assessment and plan of care as documented in the note.       Again, thank you for allowing me to participate in the care of your patient.      Sincerely,    Vonda Starks MD

## 2018-10-30 NOTE — NURSING NOTE
"Chief Complaint   Patient presents with     RECHECK     follow up acoustic neuroma      Height 1.78 m (5' 10.08\"), weight 89.4 kg (197 lb).    Esvin Ray LPN    "

## 2020-09-27 ENCOUNTER — HOSPITAL ENCOUNTER (EMERGENCY)
Facility: CLINIC | Age: 80
Discharge: HOME | End: 2020-09-27
Attending: EMERGENCY MEDICINE | Admitting: EMERGENCY MEDICINE
Payer: COMMERCIAL

## 2020-09-27 ENCOUNTER — APPOINTMENT (OUTPATIENT)
Dept: CT IMAGING | Facility: CLINIC | Age: 80
End: 2020-09-27
Attending: EMERGENCY MEDICINE
Payer: COMMERCIAL

## 2020-09-27 ENCOUNTER — APPOINTMENT (OUTPATIENT)
Dept: GENERAL RADIOLOGY | Facility: CLINIC | Age: 80
End: 2020-09-27
Attending: EMERGENCY MEDICINE
Payer: COMMERCIAL

## 2020-09-27 VITALS
RESPIRATION RATE: 16 BRPM | TEMPERATURE: 98.4 F | WEIGHT: 182.98 LBS | OXYGEN SATURATION: 98 % | HEIGHT: 71 IN | SYSTOLIC BLOOD PRESSURE: 176 MMHG | HEART RATE: 93 BPM | BODY MASS INDEX: 25.62 KG/M2 | DIASTOLIC BLOOD PRESSURE: 89 MMHG

## 2020-09-27 DIAGNOSIS — S82.832A CLOSED FRACTURE OF DISTAL END OF LEFT FIBULA, UNSPECIFIED FRACTURE MORPHOLOGY, INITIAL ENCOUNTER: ICD-10-CM

## 2020-09-27 DIAGNOSIS — G93.0 BRAIN CYST: ICD-10-CM

## 2020-09-27 DIAGNOSIS — E04.1 THYROID NODULE: ICD-10-CM

## 2020-09-27 PROCEDURE — 70450 CT HEAD/BRAIN W/O DYE: CPT

## 2020-09-27 PROCEDURE — 73610 X-RAY EXAM OF ANKLE: CPT | Mod: LT

## 2020-09-27 PROCEDURE — 27786 TREATMENT OF ANKLE FRACTURE: CPT | Mod: LT

## 2020-09-27 PROCEDURE — 72125 CT NECK SPINE W/O DYE: CPT

## 2020-09-27 PROCEDURE — 99285 EMERGENCY DEPT VISIT HI MDM: CPT | Mod: 25

## 2020-09-27 ASSESSMENT — ENCOUNTER SYMPTOMS
JOINT SWELLING: 1
MYALGIAS: 1
NECK PAIN: 1
NECK STIFFNESS: 1

## 2020-09-27 ASSESSMENT — MIFFLIN-ST. JEOR: SCORE: 1570

## 2020-09-27 NOTE — ED AVS SNAPSHOT
Emergency Department  [address] [place] [address]  Phone:  [phone]  Fax:  [phone]                                    Ada Colvin   MRN: [id number]    Department:   Emergency Department   Date of Visit:  9/27/2020           After Visit Summary Signature Page    I have received my discharge instructions, and my questions have been answered. I have discussed any challenges I see with this plan with the nurse or doctor.    ..........................................................................................................................................  Patient/Patient Representative Signature      ..........................................................................................................................................  Patient Representative Print Name and Relationship to Patient    ..................................................               ................................................  Date                                   Time    ..........................................................................................................................................  Reviewed by Signature/Title    ...................................................              ..............................................  Date                                               Time          22EPIC Rev 08/18

## 2020-09-27 NOTE — ED PROVIDER NOTES
"  History   Chief Complaint:  Ankle Pain     The history is provided by the patient and the spouse.      Ada Colvin is a 80 year old male with history of hypertension, who presents for evaluation of ankle pain. The patient notes that he had stepped down on his steps at his house last night. His wife notes that it was after it had rained last night and dark. He states that he had rolled his left ankle with pain and has troubles walking since. He notes that he had hit his head and that he may have lost consciousness. He denies blood thinners but does take a baby aspirin. He notes some slight neck pain.     Allergies:  No known drug allergies     Medications:   Aspirin   Lisinopril   Prednisone  Hydrochlorothiazide     Past Medical History:    Hypertension   Unilateral vestibular schwannoma    Past Surgical History:    ENT surgery  Orthopedic surgery    Family History:    No past pertinent family history.     Social History:  Smoking status: former smoker  Alcohol use: no  Drug use: no  PCP: Physician No Ref-Primary  Presents to the ED with wife  Up to date on immunization     Review of Systems   Musculoskeletal: Positive for gait problem, joint swelling (left ankle), myalgias (left ankle), neck pain and neck stiffness.   All other systems reviewed and are negative.      Physical Exam     Patient Vitals for the past 24 hrs:   BP Temp Temp src Pulse Resp SpO2 Height Weight   09/27/20 0952 (!) 174/71 98.4  F (36.9  C) Temporal 97 16 98 % 1.8 m (5' 10.87\") 83 kg (182 lb 15.7 oz)       Physical Exam  Physical Exam   Nursing note and vitals reviewed.  General: Oriented to person, place, and time. Appears well-developed and well-nourished.   Head: No abnormalities of swelling or bruising on left occipital scalp. No bleeding.  Mouth/Throat: Oropharynx is clear and moist.   Eyes: Conjunctivae are normal. Pupils are equal, round, and reactive to light.   Neck: Normal range of motion. No nuchal rigidity.   Cardiovascular: " Normal rate and regular rhythm.    Respiratory: Effort normal and breath sounds normal. No respiratory distress.   Abdominal: Soft. There is no tenderness. There is no guarding.   Musculoskeletal: Normal range of motion. no edema. Swelling and bruising around left ankle.   Neurological: The patient is alert and oriented to person, place, and time.  PERRLA, EOMI, visual fields intact, strength in upper/lower extremities normal and symmetrical.   Sensation normal. Speech normal  GCS eye subscore is 4. GCS verbal subscore is 5. GCS motor subscore is 6.   Skin: Skin is warm and dry. No rash noted.   Psychiatric: normal mood and affect. behavior is normal.        Emergency Department Course     Imaging:  Radiology findings were communicated with the patient who voiced understanding of the findings.    Head CT:  1. No evidence of acute intracranial pathology.   2. Nonspecific white matter changes likely due to chronic   microvascular ischemic disease.   3. Known cystic lesion in the left cerebellopontine angle cistern is   much better appreciated by MR and not well evaluated by this   noncontrast CT.     Reading per radiology     Cervical spine CT:  1. No evidence of acute fracture or subluxation in the cervical spine.   2. Degenerative changes in the cervical spine as described above.   3. Large nodule on the right thyroid gland measuring up to at least   4.3 cm. Recommend further evaluation with thyroid ultrasound on a   nonemergent basis.     Reading per radiology     XR Ankle left:  Soft tissue swelling about the ankle. There is an oblique   fracture in the distal shaft of the fibula coursing from the   tibiotalar joint line superolaterally. There is up to 4 mm of   displacement. No angulation. The ankle mortise appears symmetric.   There are arterial calcifications. Small enthesophyte at the Achilles   tendon insertion site. Otherwise unremarkable.      Reading per radiology      Procedures    Posterior stirrup  Splint Placement     Splint was applied and after placement I checked and adjusted the fit to ensure proper positioning. Patient was more comfortable with splint in place. Sensation and circulation are intact after splint placement.        Emergency Department Course:   Nursing notes and vitals reviewed.    1012 I performed an exam of the patient as documented above.     1054 The patient was sent for a Head CT and cervical spine CT while in the emergency department, results above.      1110 The patient was sent for a left ankle XR while in the emergency department, results above.      1150 I personally reviewed the results with the patient and answered all related questions prior to discharge.    Splint was placed and the patient was given crutches and instructed on safe use.     Findings and plan explained to the Patient and spouse. Patient discharged home with instructions regarding supportive care, medications, and reasons to return. The importance of close follow-up was reviewed.       Impression & Plan      Medical Decision Making:  Ada Colvin is a 80 year old male who presents for evaluation of ankle pain after tripped and fell. CMS is intact distally in the extremity.  Pulses are normal and there are no signs of serious sequelae including compartment syndrome of the leg or foot.      Xrays reveal an ankle fracture that does not need reduction at this time. They understand that this need for reduction and/or surgery may change with time and orthopedic consultation.  There is no indication for ortho consultation from the ED. Rather, close follow-up is indicated in the next days.  Splint and fracture precautions for home.  The patients head to toe trauma exam is otherwise normal at this time. The patient does note that he had head trauma and loss of consciousness with some neck stiffness and CT head and neck were obtained. Imaging of the Head and Neck returned negative. Crutch training NWB was instructed as  well as placement of splint. I offered pain medications but the patient declined.  I recommended follow up with his PCP and orthopedics tomorrow.     Diagnosis:    ICD-10-CM    1. Closed fracture of distal end of left fibula, unspecified fracture morphology, initial encounter  S82.832A    2. Thyroid nodule  E04.1    3. 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     Scribe Disclosure:  I, Gaye Chilel, am serving as a scribe at 10:12 AM on 9/27/2020 to document services personally performed by Khai Casillas MD based on my observations and the provider's statements to me.  [facility] EMERGENCY DEPARTMENT         Khai Casillas MD  09/27/20 2458

## 2020-11-11 ENCOUNTER — HOSPITAL ENCOUNTER (EMERGENCY)
Facility: CLINIC | Age: 80
Discharge: HOME | End: 2020-11-11
Admitting: EMERGENCY MEDICINE
Payer: COMMERCIAL

## 2020-11-11 VITALS
DIASTOLIC BLOOD PRESSURE: 57 MMHG | OXYGEN SATURATION: 98 % | RESPIRATION RATE: 16 BRPM | HEART RATE: 86 BPM | SYSTOLIC BLOOD PRESSURE: 164 MMHG | TEMPERATURE: 97.4 F

## 2020-11-11 PROCEDURE — 999N000104 HC STATISTIC NO CHARGE

## 2020-11-11 NOTE — ED NOTES
Patient called to go back to a room. Patient states he doesn't want to be seen here right now. Patient states wife is coming to pick him up. Patient signed declination of medical screening exam.

## 2020-11-11 NOTE — ED TRIAGE NOTES
At ortho doc for broken leg re check and was sitting with MD got dizzy and almost fell out of chair, states wife caught him, feels better now

## 2022-01-28 NOTE — PROGRESS NOTES
AUDIOLOGY REPORT    SUMMARY: Audiology visit completed. See audiogram for results.      RECOMMENDATIONS: Follow-up with ENT.    Christopher Arciniega.  Licensed Audiologist  [place] # [id number]    
No

## 2023-01-17 ENCOUNTER — HOSPITAL ENCOUNTER (OUTPATIENT)
Facility: CLINIC | Age: 83
End: 2023-01-17
Attending: ORTHOPAEDIC SURGERY | Admitting: ORTHOPAEDIC SURGERY
Payer: COMMERCIAL

## 2023-04-05 ENCOUNTER — MEDICAL CORRESPONDENCE (OUTPATIENT)
Dept: HEALTH INFORMATION MANAGEMENT | Facility: CLINIC | Age: 83
End: 2023-04-05

## 2023-04-19 RX ORDER — CEFAZOLIN SODIUM/WATER 2 G/20 ML
2 SYRINGE (ML) INTRAVENOUS EVERY 8 HOURS
Status: CANCELLED | OUTPATIENT
Start: 2023-04-19 | End: 2023-04-20

## 2023-04-19 RX ORDER — ONDANSETRON 2 MG/ML
4 INJECTION INTRAMUSCULAR; INTRAVENOUS EVERY 6 HOURS PRN
Status: CANCELLED | OUTPATIENT
Start: 2023-04-19

## 2023-04-19 RX ORDER — HYDROXYZINE HYDROCHLORIDE 10 MG/1
10 TABLET, FILM COATED ORAL EVERY 6 HOURS PRN
Status: CANCELLED | OUTPATIENT
Start: 2023-04-19

## 2023-04-19 RX ORDER — OXYCODONE HYDROCHLORIDE 5 MG/1
5 TABLET ORAL EVERY 4 HOURS PRN
Status: CANCELLED | OUTPATIENT
Start: 2023-04-19

## 2023-04-19 RX ORDER — HYDROMORPHONE HYDROCHLORIDE 1 MG/ML
0.4 INJECTION, SOLUTION INTRAMUSCULAR; INTRAVENOUS; SUBCUTANEOUS
Status: CANCELLED | OUTPATIENT
Start: 2023-04-19

## 2023-04-19 RX ORDER — ACETAMINOPHEN 325 MG/1
975 TABLET ORAL EVERY 8 HOURS
Status: CANCELLED | OUTPATIENT
Start: 2023-04-19 | End: 2023-04-22

## 2023-04-19 RX ORDER — SODIUM CHLORIDE, SODIUM LACTATE, POTASSIUM CHLORIDE, CALCIUM CHLORIDE 600; 310; 30; 20 MG/100ML; MG/100ML; MG/100ML; MG/100ML
INJECTION, SOLUTION INTRAVENOUS CONTINUOUS
Status: CANCELLED | OUTPATIENT
Start: 2023-04-19

## 2023-04-19 RX ORDER — LIDOCAINE 40 MG/G
CREAM TOPICAL
Status: CANCELLED | OUTPATIENT
Start: 2023-04-19

## 2023-04-19 RX ORDER — HYDROMORPHONE HYDROCHLORIDE 1 MG/ML
0.2 INJECTION, SOLUTION INTRAMUSCULAR; INTRAVENOUS; SUBCUTANEOUS
Status: CANCELLED | OUTPATIENT
Start: 2023-04-19

## 2023-04-19 RX ORDER — AMOXICILLIN 250 MG
1 CAPSULE ORAL 2 TIMES DAILY
Status: CANCELLED | OUTPATIENT
Start: 2023-04-19

## 2023-04-19 RX ORDER — POLYETHYLENE GLYCOL 3350 17 G/17G
17 POWDER, FOR SOLUTION ORAL DAILY
Status: CANCELLED | OUTPATIENT
Start: 2023-04-20

## 2023-04-19 RX ORDER — BISACODYL 10 MG
10 SUPPOSITORY, RECTAL RECTAL DAILY PRN
Status: CANCELLED | OUTPATIENT
Start: 2023-04-19

## 2023-04-19 RX ORDER — HYDROXYZINE HYDROCHLORIDE 10 MG/1
10 TABLET, FILM COATED ORAL EVERY 6 HOURS PRN
Qty: 30 TABLET | Refills: 0 | Status: CANCELLED | OUTPATIENT
Start: 2023-04-19

## 2023-04-19 RX ORDER — ACETAMINOPHEN 325 MG/1
650 TABLET ORAL EVERY 4 HOURS PRN
Status: CANCELLED | OUTPATIENT
Start: 2023-04-22

## 2023-04-19 RX ORDER — OXYCODONE HYDROCHLORIDE 5 MG/1
10 TABLET ORAL EVERY 4 HOURS PRN
Status: CANCELLED | OUTPATIENT
Start: 2023-04-19

## 2023-04-19 RX ORDER — OXYCODONE HYDROCHLORIDE 5 MG/1
5-10 TABLET ORAL EVERY 4 HOURS PRN
Qty: 25 TABLET | Refills: 0 | Status: CANCELLED | OUTPATIENT
Start: 2023-04-19

## 2023-04-19 RX ORDER — ONDANSETRON 4 MG/1
4 TABLET, ORALLY DISINTEGRATING ORAL EVERY 6 HOURS PRN
Status: CANCELLED | OUTPATIENT
Start: 2023-04-19

## 2023-04-19 RX ORDER — DIPHENHYDRAMINE HCL 12.5MG/5ML
12.5 LIQUID (ML) ORAL EVERY 6 HOURS PRN
Status: CANCELLED | OUTPATIENT
Start: 2023-04-19

## 2023-04-19 RX ORDER — PROCHLORPERAZINE MALEATE 5 MG
5 TABLET ORAL EVERY 6 HOURS PRN
Status: CANCELLED | OUTPATIENT
Start: 2023-04-19

## 2023-04-19 RX ORDER — METHOCARBAMOL 500 MG/1
500 TABLET, FILM COATED ORAL EVERY 6 HOURS PRN
Status: CANCELLED | OUTPATIENT
Start: 2023-04-19

## 2023-04-19 RX ORDER — ACETAMINOPHEN 325 MG/1
650 TABLET ORAL EVERY 4 HOURS PRN
Qty: 100 TABLET | Refills: 0 | Status: CANCELLED | OUTPATIENT
Start: 2023-04-19

## 2023-04-19 RX ORDER — AMOXICILLIN 250 MG
1-2 CAPSULE ORAL 2 TIMES DAILY
Qty: 30 TABLET | Refills: 0 | Status: CANCELLED | OUTPATIENT
Start: 2023-04-19

## 2023-04-24 NOTE — PROGRESS NOTES
Pre-op Total Joint Patient Screening    1. Do you have a ride available to come to the hospital the day after your surgery by 8am with anticipated discharge of 11am? Y  2. What is the name of this person? Julian (spouse)  3. Do you have a  set up after surgery? Y  4. Will your  be the same person that gives you a ride home after surgery? Y  5. Have you received the Joint Replacement Guidebook? Y  6. Do you have any questions about your guidebook? N  7. Have you signed up for Epic Care Companion? N  8. Have you signed up for MY Chart access? N

## 2023-04-25 ENCOUNTER — ANESTHESIA EVENT (OUTPATIENT)
Dept: SURGERY | Facility: CLINIC | Age: 83
DRG: 470 | End: 2023-04-25
Payer: COMMERCIAL

## 2023-04-25 RX ORDER — ROSUVASTATIN CALCIUM 20 MG/1
20 TABLET, COATED ORAL EVERY EVENING
COMMUNITY

## 2023-04-25 RX ORDER — ATENOLOL 25 MG/1
25 TABLET ORAL DAILY
COMMUNITY

## 2023-04-25 RX ORDER — SENNOSIDES 8.6 MG
2600 CAPSULE ORAL EVERY 8 HOURS PRN
Status: ON HOLD | COMMUNITY
End: 2023-04-26

## 2023-04-25 RX ORDER — ASPIRIN 81 MG/1
81 TABLET ORAL DAILY
Status: ON HOLD | COMMUNITY
End: 2023-04-26

## 2023-04-25 RX ORDER — AMLODIPINE BESYLATE 5 MG/1
5 TABLET ORAL DAILY
COMMUNITY

## 2023-04-25 RX ORDER — NITROGLYCERIN 0.4 MG/1
0.4 TABLET SUBLINGUAL EVERY 5 MIN PRN
COMMUNITY

## 2023-04-25 ASSESSMENT — COPD QUESTIONNAIRES: COPD: 0

## 2023-04-25 ASSESSMENT — LIFESTYLE VARIABLES: TOBACCO_USE: 0

## 2023-04-25 NOTE — ANESTHESIA PREPROCEDURE EVALUATION
Anesthesia Pre-Procedure Evaluation    Patient: Ada Colvin   MRN: [id number] : 1940        Procedure : Procedure(s):  LEFT DIRECT ANTERIOR TOTAL HIP ARTHROPLASTY          Past Medical History:   Diagnosis Date     Hypertension       Past Surgical History:   Procedure Laterality Date     ENT SURGERY       ORTHOPEDIC SURGERY        No Known Allergies   Social History     Tobacco Use     Smoking status: Former     Types: Pipe, Cigarettes     Quit date: 3/2/2005     Years since quittin.1     Smokeless tobacco: Former     Tobacco comments:     start smoking since 8564-4571 cigarettes/ pipe   Vaping Use     Vaping status: Not on file   Substance Use Topics     Alcohol use: Not on file      Wt Readings from Last 1 Encounters:   20 83 kg (182 lb 15.7 oz)        Anesthesia Evaluation   Pt has had prior anesthetic. Type: General.    No history of anesthetic complications       ROS/MED HX  ENT/Pulmonary:    (-) tobacco use, asthma, COPD and sleep apnea   Neurologic: Comment: Mild dementia like memory changes. Totally independent.      Cardiovascular:     (+) Dyslipidemia hypertension----- (-) CAD   METS/Exercise Tolerance: >4 METS    Hematologic:  - neg hematologic  ROS     Musculoskeletal:   (+) arthritis,     GI/Hepatic:    (-) GERD and liver disease   Renal/Genitourinary:     (+) renal disease, type: CRI,     Endo:    (-) Type I DM and Type II DM   Psychiatric/Substance Use:       Infectious Disease:       Malignancy:       Other:            Physical Exam    Airway        Mallampati: III   TM distance: > 3 FB   Neck ROM: full   Mouth opening: > 3 cm    Respiratory Devices and Support         Dental     Comment: Missing several teeth, many worn edges        Cardiovascular   cardiovascular exam normal          Pulmonary   pulmonary exam normal                OUTSIDE LABS:  CBC:   Lab Results   Component Value Date    WBC 4.8 2013    HGB 13.4 2013    HCT 38.7 (L) 2013      12/25/2013     BMP:   Lab Results   Component Value Date     08/29/2014     12/25/2013    POTASSIUM 4.2 08/29/2014    POTASSIUM 4.1 12/25/2013    CHLORIDE 105 08/29/2014    CHLORIDE 106 12/25/2013    CO2 27 08/29/2014    CO2 30 12/25/2013    BUN 11 08/29/2014    BUN 16 12/25/2013    CR 1.09 08/29/2014    CR 1.00 12/25/2013     (H) 08/29/2014     (H) 12/25/2013     COAGS: No results found for: PTT, INR, FIBR  POC: No results found for: BGM, HCG, HCGS  HEPATIC:   Lab Results   Component Value Date    ALBUMIN 3.9 12/25/2013    PROTTOTAL 6.6 (L) 12/25/2013    ALT 32 12/25/2013    AST 23 12/25/2013    ALKPHOS 52 12/25/2013    BILITOTAL 0.5 12/25/2013     OTHER:   Lab Results   Component Value Date    RICKEY 9.0 08/29/2014       Anesthesia Plan    ASA Status:  3   NPO Status:  NPO Appropriate    Anesthesia Type: General.     - Airway: ETT   Induction: Intravenous.   Maintenance: Balanced.        Consents    Anesthesia Plan(s) and associated risks, benefits, and realistic alternatives discussed. Questions answered and patient/representative(s) expressed understanding.    - Discussed:     - Discussed with:  Patient      - Extended Intubation/Ventilatory Support Discussed: No.      - Patient is DNR/DNI Status: No    Use of blood products discussed: No .     Postoperative Care    Pain management: Multi-modal analgesia.   PONV prophylaxis: Ondansetron (or other 5HT-3), Dexamethasone or Solumedrol, Background Propofol Infusion     Comments:    Other Comments: No Versed            Derrick Alejandro MD

## 2023-04-25 NOTE — PROGRESS NOTES
PTA medications updated by Medication Scribe prior to surgery via phone call with patient (last doses completed by Nurse)     Medication history sources: Patient, Patient's family/friend (Spouse, Debo) and H&P  In the past week, patient estimated taking medication this percent of the time: Greater than 90%      Significant changes made to the medication list:  Patient reports no longer taking the following meds (med scribe removed from PTA med list): Flonase, Hydrodiuril, Lisinopril, Prednisone      Additional medication history information:   None    Medication reconciliation completed by provider prior to medication history? No    Time spent in this activity: 40 minutes    The information provided in this note is only as accurate as the sources available at the time of update(s)      Prior to Admission medications    Medication Sig Last Dose Taking? Auth Provider Long Term End Date   acetaminophen (TYLENOL 8 HOUR ARTHRITIS PAIN) 650 MG CR tablet Take 2,600 mg by mouth every 8 hours as needed for mild pain or fever (4 x 650 mg = 2600 mg) Unknown at PRN Yes Reported, Patient     amLODIPine (NORVASC) 5 MG tablet Take 5 mg by mouth daily  at AM Yes Reported, Patient Yes    aspirin 81 MG EC tablet Take 81 mg by mouth daily 4/19/2023 at AM Yes Reported, Patient     atenolol (TENORMIN) 25 MG tablet Take 25 mg by mouth daily  at AM Yes Reported, Patient Yes    Cholecalciferol (VITAMIN D3) 1000 UNITS CAPS Take 1,000 Units by mouth 4/19/2023 at AM Yes Reported, Patient     diclofenac (VOLTAREN) 1 % topical gel Apply 1 g topically 3 times daily as needed for moderate pain Unknown at PRN Yes Reported, Patient     Ibuprofen (ADVIL PO) Take 200-600 mg by mouth as needed 4/19/2023 at PRN Yes Reported, Patient     Magnesium Oxide 250 MG TABS Take 1 tablet by mouth daily 4/19/2023 at AM Yes Reported, Patient     nitroGLYcerin (NITROSTAT) 0.4 MG sublingual tablet Place 0.4 mg under the tongue every 5 minutes as needed for chest  pain For chest pain place 1 tablet under the tongue every 5 minutes for 3 doses. If symptoms persist 5 minutes after 1st dose call 911. Unknown at PRN Yes Reported, Patient Yes    rosuvastatin (CRESTOR) 20 MG tablet Take 20 mg by mouth every evening  at PM Yes Reported, Patient Yes    Blood Pressure Monitor KIT 1 kit 2 times daily   Brian Nichols MD Yes

## 2023-04-26 ENCOUNTER — ANESTHESIA (OUTPATIENT)
Dept: SURGERY | Facility: CLINIC | Age: 83
DRG: 470 | End: 2023-04-26
Payer: COMMERCIAL

## 2023-04-26 ENCOUNTER — APPOINTMENT (OUTPATIENT)
Dept: GENERAL RADIOLOGY | Facility: CLINIC | Age: 83
DRG: 470 | End: 2023-04-26
Attending: ORTHOPAEDIC SURGERY
Payer: COMMERCIAL

## 2023-04-26 ENCOUNTER — HOSPITAL ENCOUNTER (INPATIENT)
Facility: CLINIC | Age: 83
LOS: 3 days | Discharge: HOME HEALTH | DRG: 470 | End: 2023-04-30
Attending: ORTHOPAEDIC SURGERY | Admitting: ORTHOPAEDIC SURGERY
Payer: COMMERCIAL

## 2023-04-26 ENCOUNTER — APPOINTMENT (OUTPATIENT)
Dept: PHYSICAL THERAPY | Facility: CLINIC | Age: 83
DRG: 470 | End: 2023-04-26
Attending: ORTHOPAEDIC SURGERY
Payer: COMMERCIAL

## 2023-04-26 DIAGNOSIS — Z96.642 STATUS POST TOTAL REPLACEMENT OF LEFT HIP: Primary | ICD-10-CM

## 2023-04-26 PROBLEM — Z96.649 S/P TOTAL HIP ARTHROPLASTY: Status: ACTIVE | Noted: 2023-04-26

## 2023-04-26 LAB
FASTING STATUS PATIENT QL REPORTED: YES
GLUCOSE SERPL-MCNC: 108 MG/DL (ref 70–99)
POTASSIUM SERPL-SCNC: 3.5 MMOL/L (ref 3.4–5.3)

## 2023-04-26 PROCEDURE — 250N000011 HC RX IP 250 OP 636: Performed by: ORTHOPAEDIC SURGERY

## 2023-04-26 PROCEDURE — 82947 ASSAY GLUCOSE BLOOD QUANT: CPT | Performed by: ANESTHESIOLOGY

## 2023-04-26 PROCEDURE — 0SRB049 REPLACEMENT OF LEFT HIP JOINT WITH CERAMIC ON POLYETHYLENE SYNTHETIC SUBSTITUTE, CEMENTED, OPEN APPROACH: ICD-10-PCS | Performed by: ORTHOPAEDIC SURGERY

## 2023-04-26 PROCEDURE — 360N000084 HC SURGERY LEVEL 4 W/ FLUORO, PER MIN: Performed by: ORTHOPAEDIC SURGERY

## 2023-04-26 PROCEDURE — 999N000063 XR PELVIS AND HIP LEFT 1 VIEW

## 2023-04-26 PROCEDURE — 999N000179 XR SURGERY CARM FLUORO LESS THAN 5 MIN W STILLS

## 2023-04-26 PROCEDURE — 258N000001 HC RX 258: Performed by: ORTHOPAEDIC SURGERY

## 2023-04-26 PROCEDURE — 250N000009 HC RX 250: Performed by: NURSE ANESTHETIST, CERTIFIED REGISTERED

## 2023-04-26 PROCEDURE — C1776 JOINT DEVICE (IMPLANTABLE): HCPCS | Performed by: ORTHOPAEDIC SURGERY

## 2023-04-26 PROCEDURE — 999N000141 HC STATISTIC PRE-PROCEDURE NURSING ASSESSMENT: Performed by: ORTHOPAEDIC SURGERY

## 2023-04-26 PROCEDURE — 84132 ASSAY OF SERUM POTASSIUM: CPT | Performed by: ANESTHESIOLOGY

## 2023-04-26 PROCEDURE — 710N000009 HC RECOVERY PHASE 1, LEVEL 1, PER MIN: Performed by: ORTHOPAEDIC SURGERY

## 2023-04-26 PROCEDURE — 258N000003 HC RX IP 258 OP 636: Performed by: ANESTHESIOLOGY

## 2023-04-26 PROCEDURE — 258N000003 HC RX IP 258 OP 636: Performed by: NURSE ANESTHETIST, CERTIFIED REGISTERED

## 2023-04-26 PROCEDURE — 97161 PT EVAL LOW COMPLEX 20 MIN: CPT | Mod: GP

## 2023-04-26 PROCEDURE — 370N000017 HC ANESTHESIA TECHNICAL FEE, PER MIN: Performed by: ORTHOPAEDIC SURGERY

## 2023-04-26 PROCEDURE — 999N000128 HC STATISTIC PERIPHERAL IV START W/O US GUIDANCE

## 2023-04-26 PROCEDURE — 258N000003 HC RX IP 258 OP 636: Performed by: ORTHOPAEDIC SURGERY

## 2023-04-26 PROCEDURE — 250N000013 HC RX MED GY IP 250 OP 250 PS 637: Performed by: ORTHOPAEDIC SURGERY

## 2023-04-26 PROCEDURE — 272N000001 HC OR GENERAL SUPPLY STERILE: Performed by: ORTHOPAEDIC SURGERY

## 2023-04-26 PROCEDURE — C1713 ANCHOR/SCREW BN/BN,TIS/BN: HCPCS | Performed by: ORTHOPAEDIC SURGERY

## 2023-04-26 PROCEDURE — 250N000013 HC RX MED GY IP 250 OP 250 PS 637: Performed by: ANESTHESIOLOGY

## 2023-04-26 PROCEDURE — 36415 COLL VENOUS BLD VENIPUNCTURE: CPT | Performed by: ANESTHESIOLOGY

## 2023-04-26 PROCEDURE — 250N000025 HC SEVOFLURANE, PER MIN: Performed by: ORTHOPAEDIC SURGERY

## 2023-04-26 PROCEDURE — 250N000011 HC RX IP 250 OP 636: Performed by: ANESTHESIOLOGY

## 2023-04-26 PROCEDURE — 97530 THERAPEUTIC ACTIVITIES: CPT | Mod: GP

## 2023-04-26 PROCEDURE — 250N000011 HC RX IP 250 OP 636: Performed by: NURSE ANESTHETIST, CERTIFIED REGISTERED

## 2023-04-26 DEVICE — IMPLANTABLE DEVICE
Type: IMPLANTABLE DEVICE | Site: HIP | Status: FUNCTIONAL
Brand: G7® LONGEVITY®

## 2023-04-26 DEVICE — IMPLANTABLE DEVICE
Type: IMPLANTABLE DEVICE | Site: HIP | Status: FUNCTIONAL
Brand: KINECTIV®

## 2023-04-26 DEVICE — BIOLOX® DELTA, CERAMIC FEMORAL HEAD, M, Ø 40/0, TAPER 12/14
Type: IMPLANTABLE DEVICE | Site: HIP | Status: FUNCTIONAL
Brand: BIOLOX® DELTA

## 2023-04-26 DEVICE — IMPLANTABLE DEVICE: Type: IMPLANTABLE DEVICE | Site: HIP | Status: FUNCTIONAL

## 2023-04-26 DEVICE — IMPLANTABLE DEVICE
Type: IMPLANTABLE DEVICE | Site: HIP | Status: FUNCTIONAL
Brand: G7® ACETABULAR SYSTEM

## 2023-04-26 RX ORDER — NITROGLYCERIN 0.4 MG/1
0.4 TABLET SUBLINGUAL EVERY 5 MIN PRN
Status: DISCONTINUED | OUTPATIENT
Start: 2023-04-26 | End: 2023-04-30 | Stop reason: HOSPADM

## 2023-04-26 RX ORDER — FENTANYL CITRATE 0.05 MG/ML
50 INJECTION, SOLUTION INTRAMUSCULAR; INTRAVENOUS
Status: DISCONTINUED | OUTPATIENT
Start: 2023-04-26 | End: 2023-04-26 | Stop reason: HOSPADM

## 2023-04-26 RX ORDER — DEXMEDETOMIDINE HYDROCHLORIDE 4 UG/ML
INJECTION, SOLUTION INTRAVENOUS PRN
Status: DISCONTINUED | OUTPATIENT
Start: 2023-04-26 | End: 2023-04-26

## 2023-04-26 RX ORDER — SODIUM CHLORIDE, SODIUM LACTATE, POTASSIUM CHLORIDE, CALCIUM CHLORIDE 600; 310; 30; 20 MG/100ML; MG/100ML; MG/100ML; MG/100ML
INJECTION, SOLUTION INTRAVENOUS CONTINUOUS
Status: DISCONTINUED | OUTPATIENT
Start: 2023-04-26 | End: 2023-04-26 | Stop reason: HOSPADM

## 2023-04-26 RX ORDER — NALOXONE HYDROCHLORIDE 0.4 MG/ML
0.4 INJECTION, SOLUTION INTRAMUSCULAR; INTRAVENOUS; SUBCUTANEOUS
Status: DISCONTINUED | OUTPATIENT
Start: 2023-04-26 | End: 2023-04-30 | Stop reason: HOSPADM

## 2023-04-26 RX ORDER — ACETAMINOPHEN 325 MG/1
650 TABLET ORAL EVERY 4 HOURS PRN
Qty: 100 TABLET | Refills: 0 | Status: SHIPPED | OUTPATIENT
Start: 2023-04-26

## 2023-04-26 RX ORDER — ONDANSETRON 2 MG/ML
4 INJECTION INTRAMUSCULAR; INTRAVENOUS EVERY 30 MIN PRN
Status: DISCONTINUED | OUTPATIENT
Start: 2023-04-26 | End: 2023-04-26 | Stop reason: HOSPADM

## 2023-04-26 RX ORDER — HYDROXYZINE HYDROCHLORIDE 10 MG/1
10 TABLET, FILM COATED ORAL EVERY 6 HOURS PRN
Qty: 30 TABLET | Refills: 0 | Status: SHIPPED | OUTPATIENT
Start: 2023-04-26

## 2023-04-26 RX ORDER — FENTANYL CITRATE 50 UG/ML
INJECTION, SOLUTION INTRAMUSCULAR; INTRAVENOUS PRN
Status: DISCONTINUED | OUTPATIENT
Start: 2023-04-26 | End: 2023-04-26

## 2023-04-26 RX ORDER — HYDROMORPHONE HCL IN WATER/PF 6 MG/30 ML
0.2 PATIENT CONTROLLED ANALGESIA SYRINGE INTRAVENOUS EVERY 5 MIN PRN
Status: DISCONTINUED | OUTPATIENT
Start: 2023-04-26 | End: 2023-04-26 | Stop reason: HOSPADM

## 2023-04-26 RX ORDER — AMOXICILLIN 250 MG
1 CAPSULE ORAL 2 TIMES DAILY
Status: DISCONTINUED | OUTPATIENT
Start: 2023-04-26 | End: 2023-04-30 | Stop reason: HOSPADM

## 2023-04-26 RX ORDER — FENTANYL CITRATE 0.05 MG/ML
50 INJECTION, SOLUTION INTRAMUSCULAR; INTRAVENOUS EVERY 5 MIN PRN
Status: DISCONTINUED | OUTPATIENT
Start: 2023-04-26 | End: 2023-04-26 | Stop reason: HOSPADM

## 2023-04-26 RX ORDER — ONDANSETRON 4 MG/1
4 TABLET, ORALLY DISINTEGRATING ORAL EVERY 30 MIN PRN
Status: DISCONTINUED | OUTPATIENT
Start: 2023-04-26 | End: 2023-04-26 | Stop reason: HOSPADM

## 2023-04-26 RX ORDER — MAGNESIUM OXIDE 400 MG/1
400 TABLET ORAL DAILY
Status: DISCONTINUED | OUTPATIENT
Start: 2023-04-26 | End: 2023-04-30 | Stop reason: HOSPADM

## 2023-04-26 RX ORDER — VITAMIN B COMPLEX
25 TABLET ORAL DAILY
Status: DISCONTINUED | OUTPATIENT
Start: 2023-04-26 | End: 2023-04-30 | Stop reason: HOSPADM

## 2023-04-26 RX ORDER — HYDROMORPHONE HCL IN WATER/PF 6 MG/30 ML
0.4 PATIENT CONTROLLED ANALGESIA SYRINGE INTRAVENOUS EVERY 5 MIN PRN
Status: DISCONTINUED | OUTPATIENT
Start: 2023-04-26 | End: 2023-04-26 | Stop reason: HOSPADM

## 2023-04-26 RX ORDER — BISACODYL 10 MG
10 SUPPOSITORY, RECTAL RECTAL DAILY PRN
Status: DISCONTINUED | OUTPATIENT
Start: 2023-04-26 | End: 2023-04-30 | Stop reason: HOSPADM

## 2023-04-26 RX ORDER — METHOCARBAMOL 500 MG/1
500 TABLET, FILM COATED ORAL EVERY 6 HOURS PRN
Status: DISCONTINUED | OUTPATIENT
Start: 2023-04-26 | End: 2023-04-30 | Stop reason: HOSPADM

## 2023-04-26 RX ORDER — NALOXONE HYDROCHLORIDE 0.4 MG/ML
0.2 INJECTION, SOLUTION INTRAMUSCULAR; INTRAVENOUS; SUBCUTANEOUS
Status: DISCONTINUED | OUTPATIENT
Start: 2023-04-26 | End: 2023-04-30 | Stop reason: HOSPADM

## 2023-04-26 RX ORDER — HYDROMORPHONE HCL IN WATER/PF 6 MG/30 ML
0.2 PATIENT CONTROLLED ANALGESIA SYRINGE INTRAVENOUS
Status: DISCONTINUED | OUTPATIENT
Start: 2023-04-26 | End: 2023-04-30 | Stop reason: HOSPADM

## 2023-04-26 RX ORDER — ACETAMINOPHEN 325 MG/1
975 TABLET ORAL EVERY 8 HOURS
Status: COMPLETED | OUTPATIENT
Start: 2023-04-26 | End: 2023-04-29

## 2023-04-26 RX ORDER — CEFAZOLIN SODIUM/WATER 2 G/20 ML
2 SYRINGE (ML) INTRAVENOUS SEE ADMIN INSTRUCTIONS
Status: DISCONTINUED | OUTPATIENT
Start: 2023-04-26 | End: 2023-04-26 | Stop reason: HOSPADM

## 2023-04-26 RX ORDER — ROSUVASTATIN CALCIUM 20 MG/1
20 TABLET, COATED ORAL EVERY EVENING
Status: DISCONTINUED | OUTPATIENT
Start: 2023-04-26 | End: 2023-04-30 | Stop reason: HOSPADM

## 2023-04-26 RX ORDER — OXYCODONE HYDROCHLORIDE 5 MG/1
10 TABLET ORAL EVERY 4 HOURS PRN
Status: DISCONTINUED | OUTPATIENT
Start: 2023-04-26 | End: 2023-04-27

## 2023-04-26 RX ORDER — ATENOLOL 25 MG/1
25 TABLET ORAL ONCE
Status: COMPLETED | OUTPATIENT
Start: 2023-04-26 | End: 2023-04-26

## 2023-04-26 RX ORDER — TRANEXAMIC ACID 650 MG/1
1950 TABLET ORAL ONCE
Status: COMPLETED | OUTPATIENT
Start: 2023-04-26 | End: 2023-04-26

## 2023-04-26 RX ORDER — ATENOLOL 25 MG/1
25 TABLET ORAL DAILY
Status: DISCONTINUED | OUTPATIENT
Start: 2023-04-26 | End: 2023-04-30 | Stop reason: HOSPADM

## 2023-04-26 RX ORDER — OXYCODONE HYDROCHLORIDE 5 MG/1
5-10 TABLET ORAL EVERY 4 HOURS PRN
Qty: 25 TABLET | Refills: 0 | Status: SHIPPED | OUTPATIENT
Start: 2023-04-26 | End: 2023-04-27

## 2023-04-26 RX ORDER — ONDANSETRON 2 MG/ML
4 INJECTION INTRAMUSCULAR; INTRAVENOUS EVERY 6 HOURS PRN
Status: DISCONTINUED | OUTPATIENT
Start: 2023-04-26 | End: 2023-04-30 | Stop reason: HOSPADM

## 2023-04-26 RX ORDER — FENTANYL CITRATE 0.05 MG/ML
25 INJECTION, SOLUTION INTRAMUSCULAR; INTRAVENOUS EVERY 5 MIN PRN
Status: DISCONTINUED | OUTPATIENT
Start: 2023-04-26 | End: 2023-04-26 | Stop reason: HOSPADM

## 2023-04-26 RX ORDER — VANCOMYCIN HYDROCHLORIDE 1 G/20ML
INJECTION, POWDER, LYOPHILIZED, FOR SOLUTION INTRAVENOUS PRN
Status: DISCONTINUED | OUTPATIENT
Start: 2023-04-26 | End: 2023-04-26 | Stop reason: HOSPADM

## 2023-04-26 RX ORDER — PROPOFOL 10 MG/ML
INJECTION, EMULSION INTRAVENOUS CONTINUOUS PRN
Status: DISCONTINUED | OUTPATIENT
Start: 2023-04-26 | End: 2023-04-26

## 2023-04-26 RX ORDER — OXYCODONE HYDROCHLORIDE 5 MG/1
5 TABLET ORAL EVERY 4 HOURS PRN
Status: DISCONTINUED | OUTPATIENT
Start: 2023-04-26 | End: 2023-04-27

## 2023-04-26 RX ORDER — DEXAMETHASONE SODIUM PHOSPHATE 4 MG/ML
INJECTION, SOLUTION INTRA-ARTICULAR; INTRALESIONAL; INTRAMUSCULAR; INTRAVENOUS; SOFT TISSUE PRN
Status: DISCONTINUED | OUTPATIENT
Start: 2023-04-26 | End: 2023-04-26

## 2023-04-26 RX ORDER — ONDANSETRON 2 MG/ML
INJECTION INTRAMUSCULAR; INTRAVENOUS PRN
Status: DISCONTINUED | OUTPATIENT
Start: 2023-04-26 | End: 2023-04-26

## 2023-04-26 RX ORDER — SODIUM CHLORIDE, SODIUM LACTATE, POTASSIUM CHLORIDE, CALCIUM CHLORIDE 600; 310; 30; 20 MG/100ML; MG/100ML; MG/100ML; MG/100ML
INJECTION, SOLUTION INTRAVENOUS CONTINUOUS
Status: DISCONTINUED | OUTPATIENT
Start: 2023-04-26 | End: 2023-04-30 | Stop reason: HOSPADM

## 2023-04-26 RX ORDER — LIDOCAINE HYDROCHLORIDE 20 MG/ML
INJECTION, SOLUTION INFILTRATION; PERINEURAL PRN
Status: DISCONTINUED | OUTPATIENT
Start: 2023-04-26 | End: 2023-04-26

## 2023-04-26 RX ORDER — PROPOFOL 10 MG/ML
INJECTION, EMULSION INTRAVENOUS PRN
Status: DISCONTINUED | OUTPATIENT
Start: 2023-04-26 | End: 2023-04-26

## 2023-04-26 RX ORDER — CEFAZOLIN SODIUM 2 G/100ML
2 INJECTION, SOLUTION INTRAVENOUS EVERY 8 HOURS
Status: COMPLETED | OUTPATIENT
Start: 2023-04-26 | End: 2023-04-27

## 2023-04-26 RX ORDER — HYDROMORPHONE HCL IN WATER/PF 6 MG/30 ML
0.4 PATIENT CONTROLLED ANALGESIA SYRINGE INTRAVENOUS
Status: DISCONTINUED | OUTPATIENT
Start: 2023-04-26 | End: 2023-04-30 | Stop reason: HOSPADM

## 2023-04-26 RX ORDER — NEOSTIGMINE METHYLSULFATE 1 MG/ML
VIAL (ML) INJECTION PRN
Status: DISCONTINUED | OUTPATIENT
Start: 2023-04-26 | End: 2023-04-26

## 2023-04-26 RX ORDER — HYDROMORPHONE HYDROCHLORIDE 1 MG/ML
INJECTION, SOLUTION INTRAMUSCULAR; INTRAVENOUS; SUBCUTANEOUS PRN
Status: DISCONTINUED | OUTPATIENT
Start: 2023-04-26 | End: 2023-04-26

## 2023-04-26 RX ORDER — ACETAMINOPHEN 325 MG/1
650 TABLET ORAL EVERY 4 HOURS PRN
Status: DISCONTINUED | OUTPATIENT
Start: 2023-04-29 | End: 2023-04-30 | Stop reason: HOSPADM

## 2023-04-26 RX ORDER — AMOXICILLIN 250 MG
1-2 CAPSULE ORAL 2 TIMES DAILY
Qty: 30 TABLET | Refills: 0 | Status: SHIPPED | OUTPATIENT
Start: 2023-04-26 | End: 2023-04-28

## 2023-04-26 RX ORDER — POLYETHYLENE GLYCOL 3350 17 G/17G
17 POWDER, FOR SOLUTION ORAL DAILY
Status: DISCONTINUED | OUTPATIENT
Start: 2023-04-27 | End: 2023-04-30 | Stop reason: HOSPADM

## 2023-04-26 RX ORDER — ONDANSETRON 4 MG/1
4 TABLET, ORALLY DISINTEGRATING ORAL EVERY 6 HOURS PRN
Status: DISCONTINUED | OUTPATIENT
Start: 2023-04-26 | End: 2023-04-30 | Stop reason: HOSPADM

## 2023-04-26 RX ORDER — LIDOCAINE 40 MG/G
CREAM TOPICAL
Status: DISCONTINUED | OUTPATIENT
Start: 2023-04-26 | End: 2023-04-30 | Stop reason: HOSPADM

## 2023-04-26 RX ORDER — GLYCOPYRROLATE 0.2 MG/ML
INJECTION, SOLUTION INTRAMUSCULAR; INTRAVENOUS PRN
Status: DISCONTINUED | OUTPATIENT
Start: 2023-04-26 | End: 2023-04-26

## 2023-04-26 RX ORDER — DIPHENHYDRAMINE HCL 12.5MG/5ML
12.5 LIQUID (ML) ORAL EVERY 6 HOURS PRN
Status: DISCONTINUED | OUTPATIENT
Start: 2023-04-26 | End: 2023-04-30 | Stop reason: HOSPADM

## 2023-04-26 RX ORDER — PROCHLORPERAZINE MALEATE 5 MG
5 TABLET ORAL EVERY 6 HOURS PRN
Status: DISCONTINUED | OUTPATIENT
Start: 2023-04-26 | End: 2023-04-30 | Stop reason: HOSPADM

## 2023-04-26 RX ORDER — CEFAZOLIN SODIUM/WATER 2 G/20 ML
2 SYRINGE (ML) INTRAVENOUS
Status: COMPLETED | OUTPATIENT
Start: 2023-04-26 | End: 2023-04-26

## 2023-04-26 RX ORDER — AMLODIPINE BESYLATE 5 MG/1
5 TABLET ORAL DAILY
Status: DISCONTINUED | OUTPATIENT
Start: 2023-04-26 | End: 2023-04-30 | Stop reason: HOSPADM

## 2023-04-26 RX ORDER — HYDROXYZINE HYDROCHLORIDE 10 MG/1
10 TABLET, FILM COATED ORAL EVERY 6 HOURS PRN
Status: DISCONTINUED | OUTPATIENT
Start: 2023-04-26 | End: 2023-04-30 | Stop reason: HOSPADM

## 2023-04-26 RX ADMIN — PHENYLEPHRINE HYDROCHLORIDE 0.5 MCG/KG/MIN: 10 INJECTION INTRAVENOUS at 07:47

## 2023-04-26 RX ADMIN — PHENYLEPHRINE HYDROCHLORIDE 100 MCG: 10 INJECTION INTRAVENOUS at 07:49

## 2023-04-26 RX ADMIN — PROPOFOL 30 MCG/KG/MIN: 10 INJECTION, EMULSION INTRAVENOUS at 07:50

## 2023-04-26 RX ADMIN — PHENYLEPHRINE HYDROCHLORIDE 100 MCG: 10 INJECTION INTRAVENOUS at 09:36

## 2023-04-26 RX ADMIN — FENTANYL CITRATE 50 MCG: 50 INJECTION, SOLUTION INTRAMUSCULAR; INTRAVENOUS at 07:35

## 2023-04-26 RX ADMIN — ASPIRIN 325 MG: 325 TABLET, COATED ORAL at 13:11

## 2023-04-26 RX ADMIN — Medication 25 MCG: at 13:12

## 2023-04-26 RX ADMIN — OXYCODONE HYDROCHLORIDE 10 MG: 5 TABLET ORAL at 20:17

## 2023-04-26 RX ADMIN — SODIUM CHLORIDE, POTASSIUM CHLORIDE, SODIUM LACTATE AND CALCIUM CHLORIDE: 600; 310; 30; 20 INJECTION, SOLUTION INTRAVENOUS at 08:55

## 2023-04-26 RX ADMIN — HYDROMORPHONE HYDROCHLORIDE 0.5 MG: 1 INJECTION, SOLUTION INTRAMUSCULAR; INTRAVENOUS; SUBCUTANEOUS at 08:27

## 2023-04-26 RX ADMIN — AMLODIPINE BESYLATE 5 MG: 5 TABLET ORAL at 13:12

## 2023-04-26 RX ADMIN — PHENYLEPHRINE HYDROCHLORIDE 50 MCG: 10 INJECTION INTRAVENOUS at 08:50

## 2023-04-26 RX ADMIN — FENTANYL CITRATE 25 MCG: 50 INJECTION, SOLUTION INTRAMUSCULAR; INTRAVENOUS at 10:42

## 2023-04-26 RX ADMIN — CEFAZOLIN SODIUM 2 G: 2 INJECTION, SOLUTION INTRAVENOUS at 17:30

## 2023-04-26 RX ADMIN — DEXMEDETOMIDINE HYDROCHLORIDE 4 MCG: 200 INJECTION INTRAVENOUS at 09:52

## 2023-04-26 RX ADMIN — LIDOCAINE HYDROCHLORIDE 100 MG: 20 INJECTION, SOLUTION INFILTRATION; PERINEURAL at 07:39

## 2023-04-26 RX ADMIN — Medication 2 G: at 07:46

## 2023-04-26 RX ADMIN — ROCURONIUM BROMIDE 50 MG: 50 INJECTION, SOLUTION INTRAVENOUS at 07:40

## 2023-04-26 RX ADMIN — PHENYLEPHRINE HYDROCHLORIDE 100 MCG: 10 INJECTION INTRAVENOUS at 07:24

## 2023-04-26 RX ADMIN — ROCURONIUM BROMIDE 20 MG: 50 INJECTION, SOLUTION INTRAVENOUS at 08:08

## 2023-04-26 RX ADMIN — GLYCOPYRROLATE 0.2 MG: 0.2 INJECTION, SOLUTION INTRAMUSCULAR; INTRAVENOUS at 09:36

## 2023-04-26 RX ADMIN — PROPOFOL 200 MG: 10 INJECTION, EMULSION INTRAVENOUS at 07:40

## 2023-04-26 RX ADMIN — DEXAMETHASONE SODIUM PHOSPHATE 10 MG: 4 INJECTION, SOLUTION INTRA-ARTICULAR; INTRALESIONAL; INTRAMUSCULAR; INTRAVENOUS; SOFT TISSUE at 07:54

## 2023-04-26 RX ADMIN — ROCURONIUM BROMIDE 10 MG: 50 INJECTION, SOLUTION INTRAVENOUS at 09:06

## 2023-04-26 RX ADMIN — ATENOLOL 25 MG: 25 TABLET ORAL at 07:04

## 2023-04-26 RX ADMIN — DEXMEDETOMIDINE HYDROCHLORIDE 4 MCG: 200 INJECTION INTRAVENOUS at 09:24

## 2023-04-26 RX ADMIN — ROCURONIUM BROMIDE 20 MG: 50 INJECTION, SOLUTION INTRAVENOUS at 08:43

## 2023-04-26 RX ADMIN — GLYCOPYRROLATE 0.8 MG: 0.2 INJECTION, SOLUTION INTRAMUSCULAR; INTRAVENOUS at 09:55

## 2023-04-26 RX ADMIN — HYDROMORPHONE HYDROCHLORIDE 0.4 MG: 0.2 INJECTION, SOLUTION INTRAMUSCULAR; INTRAVENOUS; SUBCUTANEOUS at 17:13

## 2023-04-26 RX ADMIN — PHENYLEPHRINE HYDROCHLORIDE 100 MCG: 10 INJECTION INTRAVENOUS at 09:38

## 2023-04-26 RX ADMIN — ACETAMINOPHEN 975 MG: 325 TABLET ORAL at 20:19

## 2023-04-26 RX ADMIN — PHENYLEPHRINE HYDROCHLORIDE 100 MCG: 10 INJECTION INTRAVENOUS at 09:44

## 2023-04-26 RX ADMIN — PHENYLEPHRINE HYDROCHLORIDE 200 MCG: 10 INJECTION INTRAVENOUS at 09:49

## 2023-04-26 RX ADMIN — FENTANYL CITRATE 50 MCG: 50 INJECTION, SOLUTION INTRAMUSCULAR; INTRAVENOUS at 07:39

## 2023-04-26 RX ADMIN — DEXMEDETOMIDINE HYDROCHLORIDE 4 MCG: 200 INJECTION INTRAVENOUS at 09:21

## 2023-04-26 RX ADMIN — FENTANYL CITRATE 25 MCG: 50 INJECTION, SOLUTION INTRAMUSCULAR; INTRAVENOUS at 10:33

## 2023-04-26 RX ADMIN — Medication 400 MG: at 13:12

## 2023-04-26 RX ADMIN — SODIUM CHLORIDE, POTASSIUM CHLORIDE, SODIUM LACTATE AND CALCIUM CHLORIDE: 600; 310; 30; 20 INJECTION, SOLUTION INTRAVENOUS at 13:47

## 2023-04-26 RX ADMIN — TRANEXAMIC ACID 1950 MG: 650 TABLET ORAL at 06:37

## 2023-04-26 RX ADMIN — NEOSTIGMINE METHYLSULFATE 5 MG: 1 INJECTION, SOLUTION INTRAVENOUS at 09:56

## 2023-04-26 RX ADMIN — ACETAMINOPHEN 975 MG: 325 TABLET ORAL at 13:09

## 2023-04-26 RX ADMIN — ONDANSETRON 4 MG: 2 INJECTION INTRAMUSCULAR; INTRAVENOUS at 09:19

## 2023-04-26 RX ADMIN — SODIUM CHLORIDE, POTASSIUM CHLORIDE, SODIUM LACTATE AND CALCIUM CHLORIDE: 600; 310; 30; 20 INJECTION, SOLUTION INTRAVENOUS at 06:39

## 2023-04-26 RX ADMIN — SENNOSIDES AND DOCUSATE SODIUM 1 TABLET: 50; 8.6 TABLET ORAL at 20:19

## 2023-04-26 RX ADMIN — ROSUVASTATIN CALCIUM 20 MG: 20 TABLET, FILM COATED ORAL at 20:19

## 2023-04-26 RX ADMIN — METHOCARBAMOL 500 MG: 500 TABLET ORAL at 18:11

## 2023-04-26 ASSESSMENT — ACTIVITIES OF DAILY LIVING (ADL)
ADLS_ACUITY_SCORE: 20
ADLS_ACUITY_SCORE: 20
ADLS_ACUITY_SCORE: 24
ADLS_ACUITY_SCORE: 20
ADLS_ACUITY_SCORE: 20
ADLS_ACUITY_SCORE: 24
ADLS_ACUITY_SCORE: 20

## 2023-04-26 NOTE — ANESTHESIA CARE TRANSFER NOTE
Patient: Ada Colvin    Procedure: Procedure(s):  LEFT DIRECT ANTERIOR TOTAL HIP ARTHROPLASTY       Diagnosis: Primary osteoarthritis of left hip [M16.12]  Diagnosis Additional Information: No value filed.    Anesthesia Type:   General     Note:    Oropharynx: oropharynx clear of all foreign objects  Level of Consciousness: awake  Oxygen Supplementation: face mask    Independent Airway: airway patency satisfactory and stable  Dentition: dentition unchanged  Vital Signs Stable: post-procedure vital signs reviewed and stable  Report to RN Given: handoff report given  Patient transferred to: PACU    Handoff Report: Identifed the Patient, Identified the Reponsible Provider, Reviewed the pertinent medical history, Discussed the surgical course, Reviewed Intra-OP anesthesia mangement and issues during anesthesia, Set expectations for post-procedure period and Allowed opportunity for questions and acknowledgement of understanding      Vitals:  Vitals Value Taken Time   BP     Temp     Pulse 62 04/26/23 1007   Resp 17 04/26/23 1007   SpO2 99 % 04/26/23 1007   Vitals shown include unvalidated device data.    Electronically Signed By: SUMAN Booker CRNA  April 26, 2023  10:08 AM

## 2023-04-26 NOTE — BRIEF OP NOTE
[facility]    Brief Operative Note    Pre-operative diagnosis: Primary osteoarthritis of left hip [M16.12]  Post-operative diagnosis Same as pre-operative diagnosis    Procedure: Procedure(s):  LEFT DIRECT ANTERIOR TOTAL HIP ARTHROPLASTY  Surgeon: Surgeon(s) and Role:     * Jonas Carbajal MD - Primary  Anesthesia: General   Estimated Blood Loss: 300 ml    Drains: None  Specimens: * No specimens in log *  Findings:   Advanced DJD left hip.  Complications: None.  Implants:   Implant Name Type Inv. Item Serial No.  Lot No. LRB No. Used Action   IMP SHELL BIOM G7 ACETAB PPS WALLER HOLE 58MM  G [id number] - [id number] Total Joint Component/Insert IMP SHELL BIOM G7 ACETAB PPS WALLER HOLE 58MM [facility] [place] [id number]  [employer] [id number] Left 1 Implanted   IMP SCR ZIM 6.5X25MM ACET CUP SELF TAP -065-25 - [id number] Metallic Hardware/[employer] IMP SCR ZIM 6.5X25MM ACET CUP SELF TAP -065-25  [employer] [id number] Left 1 Implanted   g7 acetabular system longevity highly crosslinked polyethylene      [id number] Left 1 Implanted   IMP SCR ZIM 6.5X30MM ACET CUP SELF TAP -065-30 - [id number] Metallic Hardware/[employer] IMP SCR ZIM 6.5X30MM ACET CUP SELF TAP -065-30  [employer] [id number] Left 1 Wasted   IMP STEM ZIM TAPER KINECTIV M/L  11 -011-00 - [id number] Total Joint Component/Insert IMP STEM ZIM TAPER KINECTIV M/L  11 -011-00  [employer] [id number] Left 1 Implanted   IMP FEMORAL NECK ZIM MOD TAPER KINECTIV R -888-00 - [id number] Total Joint Component/Insert IMP FEMORAL NECK ZIM MOD TAPER KINECTIV R -024-00  [employer] [id number] Left 1 Implanted   IMP HEAD FEM ZIM BIOLOX DELTA CER 40MM +0 -241-02 - [id number] Total Joint Component/Insert IMP HEAD FEM ZIM BIOLOX DELTA CER 40MM +0 -040-02  [employer] [id number] Left 1 Implanted

## 2023-04-26 NOTE — ANESTHESIA PROCEDURE NOTES
Airway       Patient location during procedure: OR       Procedure Start/Stop Times: 4/26/2023 7:42 AM  Staff -        CRNA: Bernard Ayala APRN CRNA       Performed By: CRNA  Consent for Airway        Urgency: elective  Indications and Patient Condition       Indications for airway management: mic-procedural and airway protection       Induction type:intravenous       Mask difficulty assessment: 2 - vent by mask + OA or adjuvant +/- NMBA    Final Airway Details       Final airway type: endotracheal airway       Successful airway: ETT - single  Endotracheal Airway Details        ETT size (mm): 8.0       Cuffed: yes       Successful intubation technique: direct laryngoscopy       DL Blade Type: Kirkpatrick 2       Grade View of Cords: 1       Adjucts: stylet       Position: Right       Measured from: lips       Secured at (cm): 24       Bite block used: None    Post intubation assessment        Placement verified by: capnometry, equal breath sounds and chest rise        Number of attempts at approach: 1       Number of other approaches attempted: 0       Secured with: pink tape       Ease of procedure: easy       Dentition: Intact and Unchanged    Medication(s) Administered   Medication Administration Time: 4/26/2023 7:42 AM

## 2023-04-26 NOTE — ANESTHESIA POSTPROCEDURE EVALUATION
Patient: Ada Colvin    Procedure: Procedure(s):  LEFT DIRECT ANTERIOR TOTAL HIP ARTHROPLASTY       Anesthesia Type:  General    Note:  Disposition: Inpatient   Postop Pain Control: Uneventful            Sign Out: Well controlled pain   PONV: No   Neuro/Psych: Uneventful            Sign Out: Acceptable/Baseline neuro status   Airway/Respiratory: Uneventful            Sign Out: Acceptable/Baseline resp. status   CV/Hemodynamics: Uneventful            Sign Out: Acceptable CV status; No obvious hypovolemia; No obvious fluid overload   Other NRE: NONE   DID A NON-ROUTINE EVENT OCCUR? No           Last vitals:  Vitals Value Taken Time   /83 04/26/23 1101   Temp     Pulse 58 04/26/23 1114   Resp 15 04/26/23 1114   SpO2 99 % 04/26/23 1115   Vitals shown include unvalidated device data.    Electronically Signed By: Derrick Alejandro MD  April 26, 2023  2:40 PM

## 2023-04-26 NOTE — PLAN OF CARE
Goal Outcome Evaluation:    Patient vital signs are at baseline: Yes  Patient able to ambulate as they were prior to admission or with assist devices provided by therapies during their stay:  No,  Reason:  pt not OOB yet, therapy scheduled this afternoon  Patient MUST void prior to discharge:  Yes  Patient able to tolerate oral intake:  Yes, tolerating clear liquids  Pain has adequate pain control using Oral analgesics:  Yes  Does patient have an identified :  Yes, spouse at bedside  Has goal D/C date and time been discussed with patient:  Yes     Patient arrived to unit from PACU ~1130. A&Ox4.

## 2023-04-26 NOTE — INTERVAL H&P NOTE
I have reviewed the surgical (or preoperative) H&P that is linked to this encounter, and examined the patient. There are no significant changes    Clinical Conditions Present on Arrival:  Clinically Significant Risk Factors Present on Admission                  # Overweight: Estimated body mass index is 25.9 kg/m  as calculated from the following:    Height as of this encounter: 1.829 m (6').    Weight as of this encounter: 86.6 kg (191 lb).

## 2023-04-26 NOTE — PROGRESS NOTES
04/26/23 1600   Appointment Info   Signing Clinician's Name / Credentials (PT) Fazal Stock DPT   Rehab Comments (PT) WBAT LLE, no hip precautions   Quick Adds   Quick Adds Certification   Living Environment   People in Home spouse   Current Living Arrangements house   Home Accessibility stairs to enter home;stairs within home   Number of Stairs, Main Entrance 3   Stair Railings, Main Entrance railings safe and in good condition   Transportation Anticipated family or friend will provide   Living Environment Comments Pt lives in single level rambler with spouse who can provide good 24/7 assist, 3 ALVARO with all needs met on first floor   Self-Care   Usual Activity Tolerance good   Current Activity Tolerance moderate   Equipment Currently Used at Home cane, quad   Fall history within last six months no   Activity/Exercise/Self-Care Comment At baseline pt uses SEC at times for mobility, IND with ADL's   General Information   Onset of Illness/Injury or Date of Surgery 04/26/23   Referring Physician Jonas Carbajal MD   Patient/Family Therapy Goals Statement (PT) go home   Pertinent History of Current Problem (include personal factors and/or comorbidities that impact the POC) Pt is a 82 y.o. male s/p left anterior approach GINA on 4/26/2023, POD#0   Existing Precautions/Restrictions fall;weight bearing   Weight-Bearing Status - LLE weight-bearing as tolerated   Cognition   Affect/Mental Status (Cognition) WFL;low arousal/lethargic   Orientation Status (Cognition) oriented x 4   Follows Commands (Cognition) WNL   Pain Assessment   Patient Currently in Pain Yes, see Vital Sign flowsheet  (2/10 left hip)   Integumentary/Edema   Integumentary/Edema Comments dressing over left ant hip appears CDI   Posture    Posture Not impaired   Range of Motion (ROM)   Range of Motion ROM deficits secondary to surgical procedure;ROM deficits secondary to pain   ROM Comment deficit with left hip after surgery, otherwise appears  grossly WFL   Strength (Manual Muscle Testing)   Strength (Manual Muscle Testing) Able to perform R SLR;Deficits observed during functional mobility   Strength Comments not formally assessed, deficits with left hip after GINA   Bed Mobility   Comment, (Bed Mobility) supine<>sit Lin-ModA   Transfers   Comment, (Transfers) sit<>stand with FWW CGA   Gait/Stairs (Locomotion)   [place] Level (Gait) contact guard   Assistive Device (Gait) walker, front-wheeled   Distance in Feet 3'   Distance in Feet (Gait) 3'   Pattern (Gait) step-to   Deviations/Abnormal Patterns (Gait) antalgic;left sided deviations;heavenly decreased;gait speed decreased;stride length decreased;weight shifting decreased   Maintains Weight-bearing Status (Gait) able to maintain   Comment, (Gait/Stairs) only able to walk several feet limited by pain   Balance   Balance Comments normal sitting balance, good standing balance with FWW, impaired dynamic balance   Sensory Examination   Sensory Perception patient reports no sensory changes   Clinical Impression   Criteria for Skilled Therapeutic Intervention Yes, treatment indicated   PT Diagnosis (PT) impaired gait   Influenced by the following impairments pain, deficits with ROM, strength, balance   Functional limitations due to impairments decreased ind with bed mobility, transfers, amb, ADL's   Clinical Presentation (PT Evaluation Complexity) Stable/Uncomplicated   Clinical Presentation Rationale PMH and clinical judgement   Clinical Decision Making (Complexity) low complexity   Planned Therapy Interventions (PT) balance training;bed mobility training;cryotherapy;gait training;home exercise program;patient/family education;ROM (range of motion);strengthening;stair training;stretching;transfer training;progressive activity/exercise   Anticipated Equipment Needs at Discharge (PT) walker, rolling   Risk & Benefits of therapy have been explained evaluation/treatment results reviewed;care plan/treatment  goals reviewed;risks/benefits reviewed;current/potential barriers reviewed;participants voiced agreement with care plan;participants included;patient;spouse/significant other   PT Total Evaluation Time   PT Eval, Low Complexity Minutes (59706) 12   Plan of Care Review   Plan of Care Reviewed With patient;spouse   Therapy Certification   Start of care date 04/26/23   Certification date from 04/26/23   Certification date to 05/03/23   Medical Diagnosis Left GINA   Physical Therapy Goals   PT Frequency 2x/day   PT Predicted Duration/Target Date for Goal Attainment 04/27/23   PT Goals Bed Mobility;Transfers;Gait;Stairs   PT: Bed Mobility Supervision/stand-by assist;Supine to/from sit;Rolling;Bridging   PT: Transfers Supervision/stand-by assist;Sit to/from stand;Assistive device   PT: Gait Supervision/stand-by assist;Rolling walker;50 feet   PT: Stairs Minimal assist;3 stairs   Interventions   Interventions Quick Adds Gait Training;Therapeutic Activity;Therapeutic Procedure   Therapeutic Procedure/Exercise   Ther. Procedure: strength, endurance, ROM, flexibillity Minutes (40464) 1   Symptoms Noted During/After Treatment none   Treatment Detail/Skilled Intervention Educated on circulatory benefits of AP's after surgery and to perform throughout the day whenever resting. With pt in supine cued for AP's x 30 and pt tolerated well   Therapeutic Activity   Therapeutic Activities: dynamic activities to improve functional performance Minutes (35553) 20   Symptoms Noted During/After Treatment Increased pain   Treatment Detail/Skilled Intervention Greeted pt supine in bed with spouse present. Eval completed. Educated on orders for WBAT and no hip precautions, pt verbalized understanding. With HOB elevated supine>sit Lin with heavy cueing for set up and sequencing, pt moved very slow with increased effort limited by tomi, with HOB flat sit>supine ModA again heavy vcs and tcs for sequencing, pt kept taking breaks during movement  due to pain eventually needing ModA with legs into bed. With pt supine in bed Lin with repositioning of LLE. Sit<>stand with FWW CGA, cues for safe walker and hand placement. Increased time for line management and room set up   Gait Training   Gait Training Minutes (32614) 2   Symptoms Noted During/After Treatment (Gait Training) increased pain   Treatment Detail/Skilled Intervention pt amb only several feet from bed with CGA and FWW, very antalgic gait with short step-to pattern steps, pt needing heavy cues for sequencing of steps and walker, walker positioning for safety. Pt declined further ambulation due to pain. Nurse notified   [place] Level (Gait Training) contact guard   Physical Assistance Level (Gait Training) verbal cues;nonverbal cues (demo/gestures);1 person assist   Weight Bearing (Gait Training) weight-bearing as tolerated   Assistive Device (Gait Training) rolling walker   PT Discharge Planning   PT Plan progress gait distance, progress bed mobility, safe transfers, steps   PT Discharge Recommendation (DC Rec)   (defer to ortho)   PT Rationale for DC Rec Pt below baseline and was limited in mobility due to high pain levels. Anticipate once pain under better control pt will progress and by discharge be at/near SBA bed mobility, SBA transfers with FWW, SBA amb with FWW, Lin for steps. Pt will have good 24/7 support at home from spouse   PT Brief overview of current status bed mobility Lin-ModA, STS with FWW CGA, amb with FWW CGA   Total Session Time   Timed Code Treatment Minutes 23   Total Session Time (sum of timed and untimed services) 35       [facility]  OUTPATIENT PHYSICAL THERAPY EVALUATION  PLAN OF TREATMENT FOR OUTPATIENT REHABILITATION  (COMPLETE FOR INITIAL CLAIMS ONLY)  Patient's Last Name, First Name, M.I.  YOB: 1940  Ada Colvin                           Provider's Name  [facility] Medical Record  No.  [id number]                             Onset Date:  04/26/23   Start of Care Date:  04/26/23   Type:     _X_PT   ___OT   ___SLP Medical Diagnosis:  Left GINA              PT Diagnosis:  impaired gait Visits from SOC:  1     See note for plan of treatment, functional goals and certification details    I CERTIFY THE NEED FOR THESE SERVICES FURNISHED UNDER        THIS PLAN OF TREATMENT AND WHILE UNDER MY CARE     (Physician co-signature of this document indicates review and certification of the therapy plan).

## 2023-04-26 NOTE — OP NOTE
Procedure Date: 04/26/2023    PREOPERATIVE DIAGNOSIS:  Advanced degenerative arthritis, left hip.    POSTOPERATIVE DIAGNOSIS:  Advanced degenerative arthritis, left hip.    PROCEDURE:  Left direct anterior total hip arthroplasty.    SURGEON:  Jonas Carbajal MD    FIRST ASSISTANT:  LUCILLE Kothari    SECOND ASSISTANT:  Mandie Monterroso ATC    DESCRIPTION OF PROCEDURE:  The patient was brought to the operating room, given a general anesthetic.  He was placed supine on the radiolucent operating table and his left hip and left lower extremity were then prepped and draped in sterile fashion.  An incision was made over the anterior aspect of the hip.  This was carried down to subcutaneous tissues down to the fascia.  The fascia was incised longitudinally and the interval between the tensor fascia david and sartorius was developed deep.  The rectus muscle was reflected medially, exposing the circumflex vessels, which were cauterized.  The hip capsule was then exposed.  We excised the anterior capsule, exposing the femoral head and neck.  An osteotomy was then made in the femoral neck at the level of our preoperatively templated osteotomy level and another osteotomy was made just below the femoral head and the neck and head fragments were then removed.  There were advanced degenerative changes with complete loss of articular cartilage over a widespread area of the femoral head and acetabulum with some eburnation and wear causing some slight flattening of the femoral head.      Attention was then turned to the acetabulum.  The acetabulum was carefully reamed to 57 mm.  A 58 mm Biomet G7 cup was then impacted into place.  This had excellent fixation, which was supplemented with 1 screw, which also had good fixation.  A standard highly crosslinked polyethylene liner to accept a size 40 head was then snapped into the acetabular component.      Attention was then turned to the femur.  The piriformis was released, allowing  good exposure of the opening of the femoral canal.  The canal was opened with a starter reamer and then we sequentially broached the hip up to a size 11 M/L taper broach.  This appeared to fit nicely.  The broach was removed.  A real size 11 Kinectiv stem was then impacted into the femur.  This had excellent fixation.  We then trialed the hip with various neck lengths and offsets and it appeared that an extra extended +0 neck gave the best fit.  A real extra extended +0 neck with a size 40+0 ceramic head were then impacted onto the stem.  The hip was relocated.  The soft tissue tension appeared satisfactory.  The hip was stable through a full range of motion and the leg length and offset appeared to be restored.  The wound was then thoroughly irrigated once again, first with a dilute solution of Betadine, was allowed to sit within the wound for 3 minutes and then was thoroughly irrigated from the wound with a liter of normal saline.  We then sprinkled a gram of vancomycin powder into the wound.  The fascia was closed with some interrupted 0 Vicryl sutures, followed by a running #1 Vicryl suture.  The skin was closed with 2-0 Vicryl subcutaneous sutures and a 3-0 Stratafix running subcuticular suture.  A sterile dressing was then applied to the wound.  The patient was awakened from anesthesia and transferred to postanesthesia recovery in satisfactory condition.    It should be noted that my assistants were necessary throughout the entire procedure to assist with retraction and positioning.    Jonas Carbajal MD        D: 2023   T: 2023   MT: alana    Name:     LUAN KANG  MRN:      [id number]        Account:        [id number]   :      1940           Procedure Date: 2023     Document: [id number]

## 2023-04-27 ENCOUNTER — APPOINTMENT (OUTPATIENT)
Dept: OCCUPATIONAL THERAPY | Facility: CLINIC | Age: 83
DRG: 470 | End: 2023-04-27
Attending: ORTHOPAEDIC SURGERY
Payer: COMMERCIAL

## 2023-04-27 ENCOUNTER — APPOINTMENT (OUTPATIENT)
Dept: PHYSICAL THERAPY | Facility: CLINIC | Age: 83
DRG: 470 | End: 2023-04-27
Attending: ORTHOPAEDIC SURGERY
Payer: COMMERCIAL

## 2023-04-27 PROBLEM — Z96.642 STATUS POST TOTAL REPLACEMENT OF LEFT HIP: Status: ACTIVE | Noted: 2023-04-27

## 2023-04-27 LAB
FASTING STATUS PATIENT QL REPORTED: YES
GLUCOSE SERPL-MCNC: 125 MG/DL (ref 70–99)
HGB BLD-MCNC: 10 G/DL (ref 13.3–17.7)

## 2023-04-27 PROCEDURE — 250N000013 HC RX MED GY IP 250 OP 250 PS 637: Performed by: ORTHOPAEDIC SURGERY

## 2023-04-27 PROCEDURE — 97530 THERAPEUTIC ACTIVITIES: CPT | Mod: GP

## 2023-04-27 PROCEDURE — 97535 SELF CARE MNGMENT TRAINING: CPT | Mod: GO | Performed by: OCCUPATIONAL THERAPIST

## 2023-04-27 PROCEDURE — 250N000013 HC RX MED GY IP 250 OP 250 PS 637: Performed by: PHYSICIAN ASSISTANT

## 2023-04-27 PROCEDURE — 250N000011 HC RX IP 250 OP 636: Performed by: ORTHOPAEDIC SURGERY

## 2023-04-27 PROCEDURE — 82947 ASSAY GLUCOSE BLOOD QUANT: CPT | Performed by: ORTHOPAEDIC SURGERY

## 2023-04-27 PROCEDURE — 97110 THERAPEUTIC EXERCISES: CPT | Mod: GP | Performed by: PHYSICAL THERAPY ASSISTANT

## 2023-04-27 PROCEDURE — 97116 GAIT TRAINING THERAPY: CPT | Mod: GP

## 2023-04-27 PROCEDURE — 97116 GAIT TRAINING THERAPY: CPT | Mod: GP | Performed by: PHYSICAL THERAPY ASSISTANT

## 2023-04-27 PROCEDURE — 97110 THERAPEUTIC EXERCISES: CPT | Mod: GP

## 2023-04-27 PROCEDURE — 85018 HEMOGLOBIN: CPT | Performed by: ORTHOPAEDIC SURGERY

## 2023-04-27 PROCEDURE — 120N000001 HC R&B MED SURG/OB

## 2023-04-27 PROCEDURE — 97166 OT EVAL MOD COMPLEX 45 MIN: CPT | Mod: GO | Performed by: OCCUPATIONAL THERAPIST

## 2023-04-27 PROCEDURE — 36415 COLL VENOUS BLD VENIPUNCTURE: CPT | Performed by: ORTHOPAEDIC SURGERY

## 2023-04-27 PROCEDURE — 97530 THERAPEUTIC ACTIVITIES: CPT | Mod: GP | Performed by: PHYSICAL THERAPY ASSISTANT

## 2023-04-27 RX ORDER — OXYCODONE HYDROCHLORIDE 5 MG/1
2.5 TABLET ORAL EVERY 4 HOURS PRN
Qty: 20 TABLET | Refills: 0 | Status: SHIPPED | OUTPATIENT
Start: 2023-04-27 | End: 2023-04-28

## 2023-04-27 RX ADMIN — ROSUVASTATIN CALCIUM 20 MG: 20 TABLET, FILM COATED ORAL at 21:29

## 2023-04-27 RX ADMIN — CEFAZOLIN SODIUM 2 G: 2 INJECTION, SOLUTION INTRAVENOUS at 01:20

## 2023-04-27 RX ADMIN — AMLODIPINE BESYLATE 5 MG: 5 TABLET ORAL at 10:11

## 2023-04-27 RX ADMIN — Medication 400 MG: at 10:11

## 2023-04-27 RX ADMIN — POLYETHYLENE GLYCOL 3350 17 G: 17 POWDER, FOR SOLUTION ORAL at 10:11

## 2023-04-27 RX ADMIN — OXYCODONE HYDROCHLORIDE 10 MG: 5 TABLET ORAL at 02:34

## 2023-04-27 RX ADMIN — ACETAMINOPHEN 975 MG: 325 TABLET ORAL at 21:29

## 2023-04-27 RX ADMIN — Medication 25 MCG: at 10:11

## 2023-04-27 RX ADMIN — OXYCODONE HYDROCHLORIDE 2.5 MG: 5 TABLET ORAL at 21:35

## 2023-04-27 RX ADMIN — SENNOSIDES AND DOCUSATE SODIUM 1 TABLET: 50; 8.6 TABLET ORAL at 21:29

## 2023-04-27 RX ADMIN — ATENOLOL 25 MG: 25 TABLET ORAL at 10:11

## 2023-04-27 RX ADMIN — ACETAMINOPHEN 975 MG: 325 TABLET ORAL at 11:42

## 2023-04-27 RX ADMIN — ASPIRIN 325 MG: 325 TABLET, COATED ORAL at 10:11

## 2023-04-27 ASSESSMENT — ACTIVITIES OF DAILY LIVING (ADL)
ADLS_ACUITY_SCORE: 25
ADLS_ACUITY_SCORE: 25
ADLS_ACUITY_SCORE: 24
ADLS_ACUITY_SCORE: 24
ADLS_ACUITY_SCORE: 28
ADLS_ACUITY_SCORE: 25
DEPENDENT_IADLS:: INDEPENDENT
ADLS_ACUITY_SCORE: 24
ADLS_ACUITY_SCORE: 24
IADL_COMMENTS: SPOUSE CAN COMPLETE
ADLS_ACUITY_SCORE: 25
ADLS_ACUITY_SCORE: 24
ADLS_ACUITY_SCORE: 25
ADLS_ACUITY_SCORE: 24

## 2023-04-27 NOTE — PROGRESS NOTES
[facility]  OUTPATIENT OCCUPATIONAL THERAPY  EVALUATION  PLAN OF TREATMENT FOR OUTPATIENT REHABILITATION  (COMPLETE FOR INITIAL CLAIMS ONLY)  Patient's Last Name, First Name, M.I.  YOB: 1940  Ada Colvin                             Provider's Name  [facility] Medical Record No.  [id number]                             Onset Date:  04/26/23   Start of Care Date:  04/27/23   Type:     ___PT   _X_OT   ___SLP Medical Diagnosis:  GINA                    OT Diagnosis:  impaired ADLs Visits from SOC:  1     See note for plan of treatment, functional goals and certification details    I CERTIFY THE NEED FOR THESE SERVICES FURNISHED UNDER        THIS PLAN OF TREATMENT AND WHILE UNDER MY CARE     (Physician co-signature of this document indicates review and certification of the therapy plan).                               04/27/23 0822   Appointment Info   Signing Clinician's Name / Credentials (OT) Tatum Heredia OTR/L   Quick Adds   Quick Adds Certification   Living Environment   People in Home spouse   Current Living Arrangements house   Home Accessibility stairs to enter home;stairs within home   Number of Stairs, Main Entrance 3   Stair Railings, Main Entrance railings safe and in good condition   Transportation Anticipated family or friend will provide   Living Environment Comments Pt lives in single level rambler with spouse who can provide good 24/7 assist, 3 ALVARO with all needs met on first floor   Self-Care   Usual Activity Tolerance good   Current Activity Tolerance moderate   Fall history within last six months no   Activity/Exercise/Self-Care Comment At baseline pt uses SEC at times for mobility, IND with ADL's   Instrumental Activities of Daily Living (IADL)   IADL Comments spouse can complete   General Information   Onset of Illness/Injury or Date of Surgery 04/26/23   Referring Physician Jonas Carbajal   Patient/Family Therapy Goal  "Statement (OT) home   Additional Occupational Profile Info/Pertinent History of Current Problem 82 y.o. male s/p left anterior approach GINA on 4/26/2023, POD#1   Existing Precautions/Restrictions fall   Left Lower Extremity (Weight-bearing Status) weight-bearing as tolerated (WBAT)   Right Lower Extremity (Weight-bearing Status) full weight-bearing (FWB)   General Observations and Info activity order: ambulate with assist 3x daily   Cognitive Status Examination   Orientation Status person   Affect/Mental Status (Cognitive) WFL;confused   Follows Commands follows one-step commands;75-90% accuracy   Safety Deficit minimal deficit;safety precautions follow-through/compliance   Cognitive Status Comments pt very distractible during session, self-distracts; able to be re-directed with 1-step commands   Pain Assessment   Patient Currently in Pain Yes, see Vital Sign flowsheet   Posture   Posture not impaired   Range of Motion Comprehensive   Comment, General Range of Motion BUE WFL; LLE limited by surgical pain   Strength Comprehensive (MMT)   Comment, General Manual Muscle Testing (MMT) Assessment BUE WFL;  LLE limited by surgical pain   Coordination   Upper Extremity Coordination No deficits were identified   Transfers   Transfers sit-stand transfer;toilet transfer;shower transfer   Sit-Stand Transfer   Sit-Stand [place] (Transfers) minimum assist (75% patient effort);verbal cues   Assistive Device (Sit-Stand Transfers) walker, standard   Shower Transfer   [place] Level (Shower Transfer) moderate assist (50% patient effort)   Assistive Device (Shower Transfer) shower chair   Shower Transfer Comments walk-in shower, 8\" lip; shower chair   Toilet Transfer   Type (Toilet Transfer) sit-stand;stand-sit   [place] Level (Toilet Transfer) minimum assist (75% patient effort);verbal cues   Assistive Device (Toilet Transfer) grab bars/safety frame;walker, front-wheeled   Toilet Transfer Comments pt has TSF   Balance "   Balance Comments good seated; good standing at chair   Activities of Daily Living   BADL Assessment/Intervention lower body dressing;grooming;toileting   Lower Body Dressing Assessment/Training   [place] Level (Lower Body Dressing) maximum assist (25% patient effort)   Grooming Assessment/Training   [place] Level (Grooming) minimum assist (75% patient effort)   Toileting   [place] Level (Toileting) moderate assist (50% patient effort)   Clinical Impression   Criteria for Skilled Therapeutic Interventions Met (OT) Yes, treatment indicated   OT Diagnosis impaired ADLs   OT Problem List-Impairments impacting ADL problems related to;cognition;range of motion (ROM);strength;pain   Assessment of Occupational Performance 3-5 Performance Deficits   Identified Performance Deficits dressing, toilet transfer, shower transfer   Planned Therapy Interventions (OT) ADL retraining;transfer training   Clinical Decision Making Complexity (OT) moderate complexity   Anticipated Equipment Needs Upon Discharge (OT) reacher   Risk & Benefits of therapy have been explained evaluation/treatment results reviewed;patient;spouse/significant other   OT Total Evaluation Time   OT Eval, Moderate Complexity Minutes (44559) 10   Therapy Certification   Medical Diagnosis GINA   Start of Care Date 04/27/23   Certification date from 04/27/23   Certification date to 04/27/23   OT Goals   Therapy Frequency (OT) Daily   OT Predicted Duration/Target Date for Goal Attainment 04/27/23   OT Goals Lower Body Dressing;Transfers;Toilet Transfer/Toileting   OT: Lower Body Dressing Minimal assist   OT: Transfer Supervision/stand-by assist;with assistive device   OT: Toilet Transfer/Toileting Supervision/stand-by assist;toilet transfer;cleaning and garment management;using adaptive equipment   Interventions   Interventions Quick Adds Self-Care/Home Management   Self-Care/Home Management   Self-Care/Home Mgmt/ADL, Compensatory, Meal Prep Minutes  (62835) 33   Symptoms Noted During/After Treatment (Meal Preparation/Planning Training) fatigue   Treatment Detail/Skilled Intervention Pt self-distracting during session, needing frequent cues to initiate/progress tasks. Pt cued for hand placement, and to not abandon walker >3x in session. Pt ed in LE dressing, unable to reach feet, so ed in sock aide and pt able to dress socks/shorts Vic following. Lin ambulation in room using 2WW. Pt cued for toilet transfer, hand placement, and to not abandon walker; pt completed CGA using grab bars, as at home. SBA toileting. Pt stood at sink for hand hygienes, again cued for walker safety and to step right up to sink rather than over-reaching. Pt ed in shower transfer, demo given, and wife teaching back the demo. Pt standing and walking in room with 2WW and Lin, needing cues to return to chair. Breakfast tray arrived, pt set up with tray, CA engaged, wife present.   OT Discharge Planning   OT Plan OT: continue w/LE dressing (bring sock aide), shower transfer   OT Discharge Recommendation (DC Rec)   (defer to ortho)   OT Rationale for DC Rec Anticipate pt will be CGA toilet transfer, Lin LE dressing, Lin shower transfer, A for home chores. Pt very distractible today, wife present for education and reports she will be able to assist at home.   Total Session Time   Timed Code Treatment Minutes 33   Total Session Time (sum of timed and untimed services) 43

## 2023-04-27 NOTE — PLAN OF CARE
Goal Outcome Evaluation:       Patient vital signs are at baseline: Yes  Patient able to ambulate as they were prior to admission or with assist devices provided by therapies during their stay:  Yes  Patient MUST void prior to discharge:  No,  Pt was straight cathed due to unable to void  Patient able to tolerate oral intake:  Yes  Pain has adequate pain control using Oral analgesics:  Yes  Does patient have an identified :  No,  TBD  Has goal D/C date and time been discussed with patient:  No, TBD      Pt is Alert and orientated to name only. Wife stated he was slightly confused prior to surgery. VSS on room air. Pt occasionally restless and agitated when staff needs to do cares. Pt pulled out 3 IVS and pulled off hand MITTS. Pt needs assistance of 2 with use of gait belt and walker. Pt pulled off the L hip dressing. Sterile dressing applied, dressing CDI. Unable to assess for numbness or tingling. Pt unable to void, straight cath done, pt incontinent x1. Pain managed with Oxycodone.

## 2023-04-27 NOTE — PROGRESS NOTES
Patient vital signs are at baseline: Yes  Patient able to ambulate as they were prior to admission or with assist devices provided by therapies during their stay:  Yes, assist x2 using GB/W. Very slow moving.  Patient MUST void prior to discharge:  Yes  Patient able to tolerate oral intake:  Yes  Pain has adequate pain control using Oral analgesics:  No,  Reason:  Breakthrough pain 10/10 while working with PT. IV dilaudid given x1. PRN PO robaxin x1.  Does patient have an identified :  Yes, spouse at bedside  Has goal D/C date and time been discussed with patient:  Yes

## 2023-04-27 NOTE — PROGRESS NOTES
POD# 1    SUBJECTIVE  Denies significant pain, but was quite confused overnight  Requiring a sitter, pulled off his dressing, pulled out his IV  Still not oriented to place or time  Required straight cath for inability to void    OBJECTIVE:   /75 (BP Location: Right arm)   Pulse 89   Temp 97.8  F (36.6  C) (Axillary)   Resp 16   Ht 1.829 m (6')   Wt 86.6 kg (191 lb)   SpO2 93%   BMI 25.90 kg/m     Hemoglobin   Date Value Ref Range Status   04/27/2023 10.0 (L) 13.3 - 17.7 g/dL Final   12/25/2013 13.4 13.3 - 17.7 g/dL Final   ]   Wound: gauze dressing in place, dry      ASSESSMENT   Significant confusion and agitation overnight    PLAN   Minimize narcotics  Hopefully his mentation clears during the day   Otherwise, we will need to hold his discharge    Jonas Carbajal MD

## 2023-04-27 NOTE — CONSULTS
Care Management Initial Consult    General Information  Assessment completed with: Patient, Spouse or significant other,    Type of CM/SW Visit: Initial Assessment    Primary Care Provider verified and updated as needed: Yes   Readmission within the last 30 days: no previous admission in last 30 days         Advance Care Planning: Advance Care Planning Reviewed: present on chart          Communication Assessment  Patient's communication style: spoken language (English or Bilingual)    Hearing Difficulty or Deaf: no   Wear Glasses or Blind: yes    Cognitive  Cognitive/Neuro/Behavioral: .WDL except  Level of Consciousness: confused (improving)  Arousal Level: opens eyes spontaneously  Orientation: disoriented to, place  Mood/Behavior: other (see comments) (restless when staff needs to do cars or assessments)     Speech: whispers    Living Environment:   People in home: spouse     Current living Arrangements: house      Able to return to prior arrangements: no       Family/Social Support:  Care provided by: self, spouse/significant other  Provides care for: spouse  Marital Status:   Wife          Description of Support System: Supportive, Involved    Support Assessment: Adequate family and caregiver support    Current Resources:   Patient receiving home care services:       Community Resources:    Equipment currently used at home: cane, quad  Supplies currently used at home:      Employment/Financial:  Employment Status: retired        Financial Concerns:             Does the patient's insurance plan have a 3 day qualifying hospital stay waiver?  No    Lifestyle & Psychosocial Needs:  Social Determinants of Health     Tobacco Use: Medium Risk (4/27/2023)    Patient History      Smoking Tobacco Use: Former      Smokeless Tobacco Use: Former      Passive Exposure: Not on file   Alcohol Use: Not on file   Financial Resource Strain: Not on file   Food Insecurity: Not on file   Transportation Needs: Not on file    Physical Activity: Not on file   Stress: Not on file   Social Connections: Not on file   Intimate Partner Violence: Not on file   Depression: Not at risk (10/30/2018)    PHQ-2      PHQ-2 Score: 0   Housing Stability: Not on file       Functional Status:  Prior to admission patient needed assistance:   Dependent ADLs:: Ambulation-cane  Dependent IADLs:: Independent       Mental Health Status:  Mental Health Status: No Current Concerns       Chemical Dependency Status:  Chemical Dependency Status: No Current Concerns             Values/Beliefs:  Spiritual, Cultural Beliefs, [employer] Practices, Values that affect care:                 Additional Information:  Consult for discharge planning. 82 year old malePatient admitted on 04/27/23 for hip replacement and a tentative discharge date of 04/28/2023  Writer reviewed chart and  recommendations at discharge. Writer met with patient at bedside and introduced self and role. Patient in agreement for TCU at discharge and would [facility]. Writer discussed private room and cost associated vs shared room and patient did not have a preference. Referrals sent via [facility].    Writer discussed transportation options and possible out of pocket costs of transport with patient. Patient would like [facility] transport at discharge.    Spouse did most of the talking, SW unsure if patient understood some of the conversation. Patient appeared to be in pain.     FÁTIMA Gallardo

## 2023-04-27 NOTE — PROVIDER NOTIFICATION
Writer spoke with Kjerstin about patients status this afternoon. Little improvement in patients confusion, still needs a lot of cues and redirection for safe mobility. Avoiding use of narcotics but patient admits to a low pain tolerance. Also PT had concerns with patient discharging home, mobility required a lot of heavy assistance- see their note. Okay per PA for patient to stay overnight, order entered for SW consult.

## 2023-04-27 NOTE — PROGRESS NOTES
Patient found at end of bed with legs draped over when VAT enters room at 2130, primary nursing staff had just left room.  Primary staff immediately available to reposition patient.  After PIV placed, patient begins touching tape and extension set with opposite hand.  Request that patient leave PIV in place.  Charge RN updated, sitter not available until 2300.  VAT paged again at 2240 for loss of PIV.  Discuss with primary staff, recommend sitter in place before additional PIVs placed.  Staff agreeable to plan.

## 2023-04-27 NOTE — PROGRESS NOTES
Brief ortho note:    Contacted by nursing staff regarding increased confusion.  Wife is agreeble to lowering the oxycodone dosing for home and relying more heavily on non-narcotic pain relievers.  Adjusted inpatient oxycodone order and outpatient prescription to reflect the above agreed upon changes.    Kjerstin Foss PA-C [facility] PA

## 2023-04-27 NOTE — PLAN OF CARE
Goal Outcome Evaluation:    Patient up with assist of 1 and walker. Adequate I/O. Pain managed with Tylenol this shift. Wife at bedside most of day.

## 2023-04-28 ENCOUNTER — APPOINTMENT (OUTPATIENT)
Dept: OCCUPATIONAL THERAPY | Facility: CLINIC | Age: 83
DRG: 470 | End: 2023-04-28
Attending: ORTHOPAEDIC SURGERY
Payer: COMMERCIAL

## 2023-04-28 ENCOUNTER — APPOINTMENT (OUTPATIENT)
Dept: PHYSICAL THERAPY | Facility: CLINIC | Age: 83
DRG: 470 | End: 2023-04-28
Attending: ORTHOPAEDIC SURGERY
Payer: COMMERCIAL

## 2023-04-28 LAB
CREAT SERPL-MCNC: 0.99 MG/DL (ref 0.67–1.17)
GFR SERPL CREATININE-BSD FRML MDRD: 77 ML/MIN/1.73M2
GLUCOSE SERPL-MCNC: 113 MG/DL (ref 70–99)
HGB BLD-MCNC: 7.9 G/DL (ref 13.3–17.7)
HGB BLD-MCNC: 9 G/DL (ref 13.3–17.7)

## 2023-04-28 PROCEDURE — 97116 GAIT TRAINING THERAPY: CPT | Mod: GP

## 2023-04-28 PROCEDURE — 97530 THERAPEUTIC ACTIVITIES: CPT | Mod: GP

## 2023-04-28 PROCEDURE — 85018 HEMOGLOBIN: CPT | Performed by: ORTHOPAEDIC SURGERY

## 2023-04-28 PROCEDURE — 250N000013 HC RX MED GY IP 250 OP 250 PS 637: Performed by: PHYSICIAN ASSISTANT

## 2023-04-28 PROCEDURE — 120N000001 HC R&B MED SURG/OB

## 2023-04-28 PROCEDURE — 82947 ASSAY GLUCOSE BLOOD QUANT: CPT | Performed by: ORTHOPAEDIC SURGERY

## 2023-04-28 PROCEDURE — 97535 SELF CARE MNGMENT TRAINING: CPT | Mod: GO

## 2023-04-28 PROCEDURE — 82565 ASSAY OF CREATININE: CPT | Performed by: PHYSICIAN ASSISTANT

## 2023-04-28 PROCEDURE — 36415 COLL VENOUS BLD VENIPUNCTURE: CPT | Performed by: ORTHOPAEDIC SURGERY

## 2023-04-28 PROCEDURE — 250N000013 HC RX MED GY IP 250 OP 250 PS 637: Performed by: ORTHOPAEDIC SURGERY

## 2023-04-28 RX ORDER — AMOXICILLIN 250 MG
1 CAPSULE ORAL 2 TIMES DAILY
Qty: 30 TABLET | Refills: 0 | Status: SHIPPED | OUTPATIENT
Start: 2023-04-28

## 2023-04-28 RX ORDER — OXYCODONE HYDROCHLORIDE 5 MG/1
2.5-5 TABLET ORAL EVERY 4 HOURS PRN
Qty: 25 TABLET | Refills: 0 | Status: SHIPPED | OUTPATIENT
Start: 2023-04-28 | End: 2023-04-29

## 2023-04-28 RX ORDER — CELECOXIB 100 MG/1
100 CAPSULE ORAL DAILY
Status: DISCONTINUED | OUTPATIENT
Start: 2023-04-28 | End: 2023-04-30 | Stop reason: HOSPADM

## 2023-04-28 RX ORDER — CELECOXIB 100 MG/1
100 CAPSULE ORAL DAILY
Qty: 14 CAPSULE | Refills: 0 | Status: SHIPPED | OUTPATIENT
Start: 2023-04-28

## 2023-04-28 RX ADMIN — ATENOLOL 25 MG: 25 TABLET ORAL at 09:27

## 2023-04-28 RX ADMIN — SENNOSIDES AND DOCUSATE SODIUM 1 TABLET: 50; 8.6 TABLET ORAL at 21:02

## 2023-04-28 RX ADMIN — ACETAMINOPHEN 975 MG: 325 TABLET ORAL at 03:51

## 2023-04-28 RX ADMIN — AMLODIPINE BESYLATE 5 MG: 5 TABLET ORAL at 09:27

## 2023-04-28 RX ADMIN — CELECOXIB 100 MG: 100 CAPSULE ORAL at 09:27

## 2023-04-28 RX ADMIN — HYDROXYZINE HYDROCHLORIDE 10 MG: 10 TABLET ORAL at 03:51

## 2023-04-28 RX ADMIN — ACETAMINOPHEN 975 MG: 325 TABLET ORAL at 12:39

## 2023-04-28 RX ADMIN — POLYETHYLENE GLYCOL 3350 17 G: 17 POWDER, FOR SOLUTION ORAL at 09:26

## 2023-04-28 RX ADMIN — ROSUVASTATIN CALCIUM 20 MG: 20 TABLET, FILM COATED ORAL at 21:02

## 2023-04-28 RX ADMIN — ACETAMINOPHEN 975 MG: 325 TABLET ORAL at 21:02

## 2023-04-28 RX ADMIN — SENNOSIDES AND DOCUSATE SODIUM 1 TABLET: 50; 8.6 TABLET ORAL at 09:26

## 2023-04-28 RX ADMIN — OXYCODONE HYDROCHLORIDE 2.5 MG: 5 TABLET ORAL at 09:26

## 2023-04-28 RX ADMIN — Medication 400 MG: at 09:27

## 2023-04-28 RX ADMIN — Medication 25 MCG: at 09:27

## 2023-04-28 RX ADMIN — ASPIRIN 325 MG: 325 TABLET, COATED ORAL at 09:26

## 2023-04-28 ASSESSMENT — ACTIVITIES OF DAILY LIVING (ADL)
ADLS_ACUITY_SCORE: 28
ADLS_ACUITY_SCORE: 28
ADLS_ACUITY_SCORE: 31
ADLS_ACUITY_SCORE: 31
ADLS_ACUITY_SCORE: 34
ADLS_ACUITY_SCORE: 31

## 2023-04-28 NOTE — PROGRESS NOTES
Patient vital signs are at baseline: Yes  Patient able to ambulate as they were prior to admission or with assist devices provided by therapies during their stay:  Yes assist x1 GB/W  Patient MUST void prior to discharge:  Yes adequate I/O  Patient able to tolerate oral intake:  Yes  Pain has adequate pain control using Oral analgesics:  Yes PRN oxycodone x1 with good relief  Does patient have an identified :  Yes, wife at bedside  Has goal D/C date and time been discussed with patient:  Yes - discharge pending TCU placement.    Patient still confused at times, but mentation improved as day progressed. Oriented to self and situation. Needs frequent redirection and reorientation.

## 2023-04-28 NOTE — PROGRESS NOTES
Patient vital signs are at baseline: Yes  Patient able to ambulate as they were prior to admission or with assist devices provided by therapies during their stay:  Yes  Patient MUST void prior to discharge:  Yes  Patient able to tolerate oral intake:  Yes  Pain has adequate pain control using Oral analgesics:  Yes  Does patient have an identified :  Yes  Has goal D/C date and time been discussed with patient:   Looking for TCU placement    Pt is alert to self only. Has difficulty stating his needs. VSS on room air. [place], wears hearing aids. Pt is occasionally restless and restless when staff needs to do cares. Pt was moaning in pain, biting his blanket and stated his pain was severe. Pain managed with Oxycodone, Hydroxyzine and scheduled Tylenol. Ice pack to L hip for swelling. Needs assistance of 1 with use of walker and gait belt. L hip dressing CDI. Pt was incontinent x 2 and also used urinal.

## 2023-04-28 NOTE — PROGRESS NOTES
POD# 2    SUBJECTIVE  Confusion and agitation again last night  Required oxycodone for pain  Oriented to person only this morning, but calm    OBJECTIVE:   /70 (BP Location: Right arm, Patient Position: Supine, Cuff Size: Adult Regular)   Pulse 63   Temp 98.4  F (36.9  C)   Resp 16   Ht 1.829 m (6')   Wt 86.6 kg (191 lb)   SpO2 94%   BMI 25.90 kg/m     Hemoglobin   Date Value Ref Range Status   04/28/2023 7.9 (L) 13.3 - 17.7 g/dL Final   12/25/2013 13.4 13.3 - 17.7 g/dL Final   ]   Wound: dressing dry      ASSESSMENT   Postoperative blood loss anemia, Hgb dropped from 10.0 yesterday to 7.9 today  Postoperative delerium    PLAN   Recheck Hgb this afternoon  Mobilize in PT  Discharge planning to TCU in progress    Jonas Carbajal MD

## 2023-04-29 ENCOUNTER — APPOINTMENT (OUTPATIENT)
Dept: PHYSICAL THERAPY | Facility: CLINIC | Age: 83
DRG: 470 | End: 2023-04-29
Attending: ORTHOPAEDIC SURGERY
Payer: COMMERCIAL

## 2023-04-29 ENCOUNTER — APPOINTMENT (OUTPATIENT)
Dept: OCCUPATIONAL THERAPY | Facility: CLINIC | Age: 83
DRG: 470 | End: 2023-04-29
Attending: ORTHOPAEDIC SURGERY
Payer: COMMERCIAL

## 2023-04-29 LAB — HGB BLD-MCNC: 8.3 G/DL (ref 13.3–17.7)

## 2023-04-29 PROCEDURE — 250N000013 HC RX MED GY IP 250 OP 250 PS 637: Performed by: ORTHOPAEDIC SURGERY

## 2023-04-29 PROCEDURE — 85018 HEMOGLOBIN: CPT | Performed by: ORTHOPAEDIC SURGERY

## 2023-04-29 PROCEDURE — 250N000013 HC RX MED GY IP 250 OP 250 PS 637: Performed by: PHYSICIAN ASSISTANT

## 2023-04-29 PROCEDURE — 97535 SELF CARE MNGMENT TRAINING: CPT | Mod: GO | Performed by: OCCUPATIONAL THERAPIST

## 2023-04-29 PROCEDURE — 120N000001 HC R&B MED SURG/OB

## 2023-04-29 PROCEDURE — 97530 THERAPEUTIC ACTIVITIES: CPT | Mod: GP

## 2023-04-29 PROCEDURE — 97530 THERAPEUTIC ACTIVITIES: CPT | Mod: GO | Performed by: OCCUPATIONAL THERAPIST

## 2023-04-29 PROCEDURE — 97116 GAIT TRAINING THERAPY: CPT | Mod: GP

## 2023-04-29 PROCEDURE — 36415 COLL VENOUS BLD VENIPUNCTURE: CPT | Performed by: ORTHOPAEDIC SURGERY

## 2023-04-29 RX ORDER — OXYCODONE HYDROCHLORIDE 5 MG/1
2.5-5 TABLET ORAL EVERY 4 HOURS PRN
Qty: 25 TABLET | Refills: 0 | Status: SHIPPED | OUTPATIENT
Start: 2023-04-29

## 2023-04-29 RX ADMIN — CELECOXIB 100 MG: 100 CAPSULE ORAL at 08:54

## 2023-04-29 RX ADMIN — HYDROXYZINE HYDROCHLORIDE 10 MG: 10 TABLET ORAL at 02:41

## 2023-04-29 RX ADMIN — Medication 400 MG: at 08:54

## 2023-04-29 RX ADMIN — ACETAMINOPHEN 975 MG: 325 TABLET ORAL at 04:05

## 2023-04-29 RX ADMIN — OXYCODONE HYDROCHLORIDE 2.5 MG: 5 TABLET ORAL at 07:48

## 2023-04-29 RX ADMIN — Medication 25 MCG: at 08:54

## 2023-04-29 RX ADMIN — ASPIRIN 325 MG: 325 TABLET, COATED ORAL at 08:54

## 2023-04-29 RX ADMIN — SENNOSIDES AND DOCUSATE SODIUM 1 TABLET: 50; 8.6 TABLET ORAL at 20:55

## 2023-04-29 RX ADMIN — HYDROXYZINE HYDROCHLORIDE 10 MG: 10 TABLET ORAL at 22:31

## 2023-04-29 RX ADMIN — ATENOLOL 25 MG: 25 TABLET ORAL at 08:53

## 2023-04-29 RX ADMIN — SENNOSIDES AND DOCUSATE SODIUM 1 TABLET: 50; 8.6 TABLET ORAL at 08:54

## 2023-04-29 RX ADMIN — POLYETHYLENE GLYCOL 3350 17 G: 17 POWDER, FOR SOLUTION ORAL at 08:54

## 2023-04-29 RX ADMIN — METHOCARBAMOL 500 MG: 500 TABLET ORAL at 04:05

## 2023-04-29 RX ADMIN — ROSUVASTATIN CALCIUM 20 MG: 20 TABLET, FILM COATED ORAL at 20:54

## 2023-04-29 RX ADMIN — AMLODIPINE BESYLATE 5 MG: 5 TABLET ORAL at 08:53

## 2023-04-29 RX ADMIN — OXYCODONE HYDROCHLORIDE 5 MG: 5 TABLET ORAL at 12:26

## 2023-04-29 RX ADMIN — MAGNESIUM HYDROXIDE 30 ML: 400 SUSPENSION ORAL at 10:51

## 2023-04-29 RX ADMIN — OXYCODONE HYDROCHLORIDE 5 MG: 5 TABLET ORAL at 22:31

## 2023-04-29 RX ADMIN — OXYCODONE HYDROCHLORIDE 2.5 MG: 5 TABLET ORAL at 16:35

## 2023-04-29 ASSESSMENT — ACTIVITIES OF DAILY LIVING (ADL)
ADLS_ACUITY_SCORE: 34

## 2023-04-29 NOTE — PROGRESS NOTES
Orthopedic Surgery  Ada Colvin  2023  Admit Date:  2023  POD 3 Days Post-Op  S/P Procedure(s):  LEFT DIRECT ANTERIOR TOTAL HIP ARTHROPLASTY    Patient and spouse are still noting some confusion this am. He refused the lab work but is agreeable now.  Spouse is concerned about him at home at night because he is not following instructions well right now and she knows he does have some baseline confusion which is worse in the evenings.  She is concerned about him falling when going to the bathroom overnight.  Discussed expectations for pain as patient continues to want more oxycodone.  Discussed adding celebrex as well to help with pain along with low dose oxycodone.    Alert and orient to person, place, and time.  Vital Sign Ranges  Temperature Temp  Av.1  F (36.7  C)  Min: 97.7  F (36.5  C)  Max: 98.5  F (36.9  C)   Blood pressure Systolic (24hrs), Av , Min:115 , Max:152        Diastolic (24hrs), Av, Min:50, Max:78      Pulse Pulse  Av.8  Min: 59  Max: 76   Respirations Resp  Av  Min: 16  Max: 18   Pulse oximetry SpO2  Av.5 %  Min: 95 %  Max: 98 %       Left hip dressing is clean, dry, and intact. Minimal erythema of the surrounding skin.  Bilateral calves are soft, non-tender.  left lower extremity is NVI.    Labs:  Recent Labs   Lab Test 23  0628   POTASSIUM 3.5     Creatinine   Date Value Ref Range Status   2023 0.99 0.67 - 1.17 mg/dL Final   2014 1.09 0.66 - 1.25 mg/dL Final     Recent Labs   Lab Test 23  1434 23  0747 23  0703   HGB 9.0* 7.9* 10.0*     No results for input(s): INR in the last 70761 hours.  No results for input(s): PLT in the last 20524 hours.    A/P  1. S/p left GINA   Continue aspirin  for DVT prophylaxis.     Mobilize with PT/OT WBAT.     Continue current pain regimen-minimize oxy as able and utilize other pain modalities.    appreciate SW involvement    2. Disposition   Anticipate d/c to TCU pending placement, orders  updated.    Kjerstin L Foss, PA-C

## 2023-04-29 NOTE — PROGRESS NOTES
Patient vital signs are at baseline: Yes  Patient able to ambulate as they were prior to admission or with assist devices provided by therapies during their stay:  Yes assist x1 GB/W  Patient MUST void prior to discharge:  Yes adequate I/O  Patient able to tolerate oral intake:  Yes  Pain has adequate pain control using Oral analgesics:  Yes PRN oxycodone x3 with good relief  Does patient have an identified :  Yes, wife at bedside  Has goal D/C date and time been discussed with patient:  Yes - discharge pending TCU placement vs home.     Patient still confused with some directions/call to get up but otherwise oriented x4.

## 2023-04-29 NOTE — PLAN OF CARE
Goal Outcome Evaluation:      Pt alert to self, VSS on room air, up with assist of 1 with gait belt and walker, voiding adequately, dressing CDI, pain managed Robradha Atarax.

## 2023-04-30 ENCOUNTER — APPOINTMENT (OUTPATIENT)
Dept: OCCUPATIONAL THERAPY | Facility: CLINIC | Age: 83
DRG: 470 | End: 2023-04-30
Attending: ORTHOPAEDIC SURGERY
Payer: COMMERCIAL

## 2023-04-30 ENCOUNTER — APPOINTMENT (OUTPATIENT)
Dept: PHYSICAL THERAPY | Facility: CLINIC | Age: 83
DRG: 470 | End: 2023-04-30
Attending: ORTHOPAEDIC SURGERY
Payer: COMMERCIAL

## 2023-04-30 VITALS
RESPIRATION RATE: 16 BRPM | HEIGHT: 72 IN | WEIGHT: 191 LBS | HEART RATE: 67 BPM | TEMPERATURE: 97.5 F | DIASTOLIC BLOOD PRESSURE: 57 MMHG | BODY MASS INDEX: 25.87 KG/M2 | SYSTOLIC BLOOD PRESSURE: 129 MMHG | OXYGEN SATURATION: 98 %

## 2023-04-30 PROCEDURE — 97530 THERAPEUTIC ACTIVITIES: CPT | Mod: GP

## 2023-04-30 PROCEDURE — 97535 SELF CARE MNGMENT TRAINING: CPT | Mod: GO

## 2023-04-30 PROCEDURE — 250N000013 HC RX MED GY IP 250 OP 250 PS 637: Performed by: PHYSICIAN ASSISTANT

## 2023-04-30 PROCEDURE — 97116 GAIT TRAINING THERAPY: CPT | Mod: GP

## 2023-04-30 PROCEDURE — 250N000013 HC RX MED GY IP 250 OP 250 PS 637: Performed by: ORTHOPAEDIC SURGERY

## 2023-04-30 PROCEDURE — 97530 THERAPEUTIC ACTIVITIES: CPT | Mod: GO

## 2023-04-30 RX ADMIN — ACETAMINOPHEN 650 MG: 325 TABLET ORAL at 08:42

## 2023-04-30 RX ADMIN — POLYETHYLENE GLYCOL 3350 17 G: 17 POWDER, FOR SOLUTION ORAL at 08:42

## 2023-04-30 RX ADMIN — SENNOSIDES AND DOCUSATE SODIUM 1 TABLET: 50; 8.6 TABLET ORAL at 08:42

## 2023-04-30 RX ADMIN — Medication 25 MCG: at 08:42

## 2023-04-30 RX ADMIN — ASPIRIN 325 MG: 325 TABLET, COATED ORAL at 08:42

## 2023-04-30 RX ADMIN — CELECOXIB 100 MG: 100 CAPSULE ORAL at 08:56

## 2023-04-30 RX ADMIN — ATENOLOL 25 MG: 25 TABLET ORAL at 08:42

## 2023-04-30 RX ADMIN — OXYCODONE HYDROCHLORIDE 5 MG: 5 TABLET ORAL at 04:24

## 2023-04-30 RX ADMIN — AMLODIPINE BESYLATE 5 MG: 5 TABLET ORAL at 08:42

## 2023-04-30 RX ADMIN — Medication 400 MG: at 08:42

## 2023-04-30 ASSESSMENT — ACTIVITIES OF DAILY LIVING (ADL)
ADLS_ACUITY_SCORE: 34
ADLS_ACUITY_SCORE: 36
ADLS_ACUITY_SCORE: 36
ADLS_ACUITY_SCORE: 34

## 2023-04-30 NOTE — PLAN OF CARE
Patient vital signs are at baseline: Yes  Patient able to ambulate as they were prior to admission or with assist devices provided by therapies during their stay:  Yes SBA GB/W  Patient MUST void prior to discharge:  Yes adequate I/O  Patient able to tolerate oral intake:  Yes  Pain has adequate pain control using Oral analgesics:  Yes  Does patient have an identified :  Yes, wife at bedside  Has goal D/C date and time been discussed with patient:  Yes - discharge home. All discharge medications and instructions reviewed with patient.     Patient sent with printed prescription for oxycodone and old bottle of oxycodone that was sent with original meds on 4/27/23 put in discharge medication drawer. Updated charge nurse and should be sent to discharge pharmacy tomorrow morning.

## 2023-04-30 NOTE — DISCHARGE SUMMARY
[facility] Discharge Summary    Ada Colvin MRN# [id number]   Age: 82 year old YOB: 1940     Date of Admission:  2023  Date of Discharge::  2023  4:58 PM  Admitting Physician:  Jonas Carbajal MD  Discharge Physician:  Jonas Carbajal MD     Home clinic: [facility]:  Carl Velasco MD          Admission Diagnoses:   Osteoarthritis Left hip          Discharge Diagnosis:   Status post left total hip arthroplasty  Acute blood loss anemia due to surgery          Procedures:   Procedure(s): Total hip arthoplasty (Left)       No other procedures performed during this admission           Medications Prior to Admission:       Prior to Admission Medications   Prior to Admission Medications   Prescriptions Last Dose Informant Patient Reported? Taking?   Blood Pressure Monitor KIT   No No   Si kit 2 times daily   Cholecalciferol (VITAMIN D3) 1000 UNITS CAPS 2023 at AM Self, Spouse/Significant Other Yes Yes   Sig: Take 1,000 Units by mouth   Ibuprofen (ADVIL PO) 2023 at PRN Self, Spouse/Significant Other Yes Yes   Sig: Take 200-600 mg by mouth as needed   Magnesium Oxide 250 MG TABS 2023 at AM Self, Spouse/Significant Other Yes Yes   Sig: Take 1 tablet by mouth daily   acetaminophen (TYLENOL 8 HOUR ARTHRITIS PAIN) 650 MG CR tablet 2023 at 2200 Self, Spouse/Significant Other Yes No   Sig: Take 2,600 mg by mouth every 8 hours as needed for mild pain or fever (4 x 650 mg = 2600 mg)   amLODIPine (NORVASC) 5 MG tablet 2023 at 0900 Self, Spouse/Significant Other Yes Yes   Sig: Take 5 mg by mouth daily   aspirin 81 MG EC tablet 2023 at AM Self, Spouse/Significant Other Yes No   Sig: Take 81 mg by mouth daily   atenolol (TENORMIN) 25 MG tablet 2023 at 0900 Self, Spouse/Significant Other Yes Yes   Sig: Take 25 mg by mouth daily   diclofenac (VOLTAREN) 1 % topical gel Unknown at PRN Self, Spouse/Significant Other Yes Yes   Sig: Apply 1 g  topically 3 times daily as needed for moderate pain   nitroGLYcerin (NITROSTAT) 0.4 MG sublingual tablet Unknown at PRN Self, Spouse/Significant Other Yes Yes   Sig: Place 0.4 mg under the tongue every 5 minutes as needed for chest pain For chest pain place 1 tablet under the tongue every 5 minutes for 3 doses. If symptoms persist 5 minutes after 1st dose call 911.   rosuvastatin (CRESTOR) 20 MG tablet 4/25/2023 at 0900 Self, Spouse/Significant Other Yes Yes   Sig: Take 20 mg by mouth every evening      Facility-Administered Medications: None             Discharge Medications:     Discharge Medication List as of 4/30/2023  3:31 PM      START taking these medications    Details   acetaminophen (TYLENOL) 325 MG tablet Take 2 tablets (650 mg) by mouth every 4 hours as needed for other (mild pain), Disp-100 tablet, R-0, E-Prescribe      celecoxib (CELEBREX) 100 MG capsule Take 1 capsule (100 mg) by mouth daily, Disp-14 capsule, R-0, E-Prescribe      hydrOXYzine (ATARAX) 10 MG tablet Take 1 tablet (10 mg) by mouth every 6 hours as needed for itching or anxiety (with pain, moderate pain), Disp-30 tablet, R-0, E-Prescribe         CONTINUE these medications which have CHANGED    Details   aspirin (ASA) 325 MG EC tablet Take 1 tablet (325 mg) by mouth daily, Disp-30 tablet, R-0, E-Prescribe      oxyCODONE (ROXICODONE) 5 MG tablet Take 0.5-1 tablets (2.5-5 mg) by mouth every 4 hours as needed for moderate pain Take 1/2  tablet for pain rated 1-7 and 1 tablet for pain rated 8-10, Disp-25 tablet, R-0, Local Print      senna-docusate (SENOKOT-S/PERICOLACE) 8.6-50 MG tablet Take 1 tablet by mouth 2 times daily Take while on oral narcotics to prevent or treat constipation., Disp-30 tablet, R-0, E-Prescribe         CONTINUE these medications which have NOT CHANGED    Details   amLODIPine (NORVASC) 5 MG tablet Take 5 mg by mouth daily, Historical      atenolol (TENORMIN) 25 MG tablet Take 25 mg by mouth daily, Historical      Blood  Pressure Monitor KIT 1 kit 2 times daily, Disp-1 kit, R-0, Local PrintOMRON PREFERRED      Cholecalciferol (VITAMIN D3) 1000 UNITS CAPS Take 1,000 Units by mouth, Historical      diclofenac (VOLTAREN) 1 % topical gel Apply 1 g topically 3 times daily as needed for moderate pain, Historical      Ibuprofen (ADVIL PO) Take 200-600 mg by mouth as needed, Historical      Magnesium Oxide 250 MG TABS Take 1 tablet by mouth daily, Historical      nitroGLYcerin (NITROSTAT) 0.4 MG sublingual tablet Place 0.4 mg under the tongue every 5 minutes as needed for chest pain For chest pain place 1 tablet under the tongue every 5 minutes for 3 doses. If symptoms persist 5 minutes after 1st dose call 911., Historical      rosuvastatin (CRESTOR) 20 MG tablet Take 20 mg by mouth every evening, Historical         STOP taking these medications       acetaminophen (TYLENOL 8 HOUR ARTHRITIS PAIN) 650 MG CR tablet Comments:   Reason for Stopping: dosage change                    Consultations:   No consultations were requested during this admission          Brief History of Illness:   This patient was a 82 year old male with a known history of left hip osteoarthritis.  He underwent a period of non-operative treatment which only provided mild and intermittent relief.  After a lengthy discussion and consultation with Dr. Joans Carbajal, the patient chose to undergo a left side hip arthroplasty.  He was explained the risks,complications, and benefits of the procedure and elected to have the surgical procedure.  Patient was cleared by his primary care physician for joint replacement.           Hospital Course:   The patient tolerated the procedure well and was taken to postop recovery in stable condition.  Please refer to the full operative note for complete details.   In recovery, his post-operative films show components in excellent position.     On post-operative day 1, patient's wound was checked and noted to be healing well.   Patient had  adequate pain control and was prescribed physical therapy.  He was given 24 hrs of perioperative antibiotics.  Patient had motor strength of 5/5 on the left side.  Patient was neurovascularly intact in the left side lower extremity. He did develop quite a bit of confusion and delirium, which delayed his ability to discharge home safely.  His delirium improved slowly over the next few days, and by POD#4, he was doing well enough to discharge home with the plan for home care. His discharge hemoglobin was 8.3 and the patient did not require a blood transfusion.       He was discharged on home medications as outlined in medication reconciliation list outlined below.  The patient has instructions that if he has increased pain, fever, erythema, swelling or drainage to immediately call.          Discharge Instructions and Follow-Up:   Discharge diet: Regular   Discharge activity: Activity as tolerated   Discharge follow-up: Follow up with Dr. Carbajal in 10-14 days   Wound care: Apply bandage daily  Keep wound clean and dry  May get incision wet in shower but do not soak or scrub           Discharge Disposition:   Discharged to home with the plan for home health care      Attestation:  I have reviewed today's vital signs, notes, medications, labs and imaging.    Jonas Carbajal MD

## 2023-04-30 NOTE — PROGRESS NOTES
Patient continues to remain confused, not able to follow directions appropriately, will need TCU placement. Spoke with charge nurse, advised her that if they need anything today they can call me.     Dayami Naik  [phone]

## 2023-04-30 NOTE — PROGRESS NOTES
[id number]    Patient vital signs are at baseline:Yes  Patient able to ambulate as they were prior to admission or with assist devices provided by therapies during their stay:  Yes   Patient MUST void prior to discharge: Yes  Patient able to tolerate oral intake:Yes  Pain has adequate pain control using Oral analgesics:  Yes  Has goal D/C date and time been discussed with patient:  TBD  Pt is confused, SBA with the walker and gait belt, AO x 4, VSS on RA,  PRN oxycodone given x 1.

## 2023-04-30 NOTE — PLAN OF CARE
Goal Outcome Evaluation:     Discharge Goals that MUST be met PRIOR to Discharge     -Patient vital signs are at baseline: Yes     -Patient able to ambulate as they were prior to admission or with assist devices provided by therapies during their stay: Yes, up with 1A/GB/W     -Patient MUST void prior to discharge: Yes, adequate PO & UOP.,      -Patient able to tolerate oral intake to maintain hydration status:Yes,  adequate PO    -Patient has adequate pain control using Oral analgesics: Yes, prn oxycodone     - Hypercapnia, hypoventilation or hypoxia resolved for at least 2 hours without supplemental oxygen: on RA, sat well in the 90s.    -Deficits in sensation, mobility or coordination: CMS intact    -Patient has returned to baseline mental status: No, confused-requires frequent orientations.     Plans for TCU at discharge pending clinical improvements and bed availability.

## 2023-04-30 NOTE — PROGRESS NOTES
Care Management Discharge Note    Discharge Date: 04/30/2023       Discharge Disposition: Home Care    Discharge Services: None    Discharge DME: Walker    Discharge Transportation: family or friend will provide    Private pay costs discussed: Not applicable    PAS Confirmation Code:    Patient/family educated on Medicare website which has current facility and service quality ratings:      Education Provided on the Discharge Plan:    Persons Notified of Discharge Plans: patient, spouse, bedside RN  Patient/Family in Agreement with the Plan: yes    Handoff Referral Completed: No    Additional Information:  Met with patient and spouse regarding recommendation for homecare PT/OT at discharge and both are agreeable to homecare.  Referral made to [facility] via Epic.  Writer updated bedside RN as to plan.  Will await communication from [facility] regarding agency to follow.  If agency not secured by discharge, [facility] will contact patient regarding agency to follow patient upon discharge.        Nancy Dickson RN  Care Coordinator  [facility]  [phone] (text or call)

## 2023-04-30 NOTE — PROGRESS NOTES
Care Management Follow Up    Length of Stay (days): 3    Expected Discharge Date: 04/30/2023     Concerns to be Addressed:     Discharge planning   Patient plan of care discussed at interdisciplinary rounds: Yes    Anticipated Discharge Disposition:  TBD     Anticipated Discharge Services:  therapy  Anticipated Discharge DME:  N/A    Patient/family educated on Medicare website which has current facility and service quality ratings:  no  Education Provided on the Discharge Plan:  Yes   Patient/Family in Agreement with the Plan:  TBD    Referrals Placed by CM/SW:  N/A  Private pay costs discussed: Not applicable    Additional Information:  Spoke with patient and wife regarding discharge plans. Explained that [facility] no available beds.  Inquired about other potential choices.  Patient became upset and wanted to know who I was and why I wasn't speaking to him.  Explained to patient that I was the  and I was working on a safe discharge plan.  Patient went on to say that he was offended that I was not speaking to him and that I was speaking with his wife.  Apologized to patient and explained that I was speaking with both of them, but asking his wife where she would like to drive to.  Patient was not accepting of this explanation.  Patient would not accept any explanation and wanting to speak with a manager.  Patient's wife tried explaining the purpose of my visit and this didn't help either.  Excused myself from the room at this time and updated the bedside nurse.      Patient did much better in therapy so updated the care coordinator that patient is able to discharge home with homecare.    Will continue to follow.      MANUEL Rivera, [facility]    [phone]  [facility]

## 2023-05-01 NOTE — PLAN OF CARE
Physical Therapy Discharge Summary    Reason for therapy discharge:    Discharged to home with home therapy.    Progress towards therapy goal(s). See goals on Care Plan in [facility] electronic health record for goal details.  Goals partially met.  Barriers to achieving goals:   discharge from facility.    Therapy recommendation(s):    Continued therapy is recommended.  Rationale/Recommendations:  Per previous treating therapist: Pt currently below baseline mobility. Pt normally mobilizing independently with SEC. Currently patient needing CGA for all mobility. Patient presents with increased pain levels, weakness and confusion. Improvement in mobility noted during today's session and improved command following. Would recommend discharge home with supervision and assist for all mobility and cares with use of FWW to mobilize. Would also recommend  PT for improvement of strength, safety, activity tolerance, and independence with functional mobility. Currently would be too taxing for patient to leave home. Spouse wanting bedside commod and gait belt which she stated she will order from [employer]..

## 2024-04-29 NOTE — PROGRESS NOTES
Spoke with charge nurse, patient did well with PT today and will be discharged home, wife is in agreement with this. Discharge orders placed.     deisi ohara   [phone]   no

## (undated) DEVICE — SU VICRYL 0 CT-1 CR 8X18" J740D

## (undated) DEVICE — SOL NACL 0.9% INJ 1000ML BAG 2B1324X

## (undated) DEVICE — GLOVE BIOGEL PI SZ 7.5 40875

## (undated) DEVICE — SU VICRYL 1 CT-1 27" UND J261H

## (undated) DEVICE — GOWN IMPERVIOUS SPECIALTY XLG/XLONG 32474

## (undated) DEVICE — DRSG AQUACEL AG 3.5X9.75" HYDROFIBER 412011

## (undated) DEVICE — DRAPE STERI TOWEL LG 1010

## (undated) DEVICE — DRAPE SHEET REV FOLD 3/4 9349

## (undated) DEVICE — SUTURE STRATAFIX SPIRAL MONOCRYL PLUS 3-0 PS-1 SP SXMP1B101

## (undated) DEVICE — DRILL BIT BIOM QUICK CONNECTING RING LOCK 3.2X20MM 31-323220

## (undated) DEVICE — SU VICRYL 2-0 CT-1 27" UND J259H

## (undated) DEVICE — SOLUTION WOUND CLEANSING 3/4OZ 10% PVP EA-L3011FB-50

## (undated) DEVICE — SU ETHIBOND 1 CT-1 30" X425H

## (undated) DEVICE — SUCTION IRR SYSTEM W/O TIP INTERPULSE HANDPIECE 0210-100-000

## (undated) DEVICE — GLOVE BIOGEL PI MICRO INDICATOR UNDERGLOVE SZ 7.5 48975

## (undated) DEVICE — GLOVE SENSICARE PI MICRO PF 6.5 LATEX FREE MSG9665

## (undated) DEVICE — DRAPE C-ARM 60X42" 1013

## (undated) DEVICE — SOL WATER IRRIG 1000ML BOTTLE 2F7114

## (undated) DEVICE — LINEN TOWEL PACK X5 5464

## (undated) DEVICE — SOL NACL 0.9% IRRIG 1000ML BOTTLE 2F7124

## (undated) DEVICE — GLOVE BIOGEL PI ORTHOPRO SZ 7.5 47675

## (undated) DEVICE — DRAPE IOBAN INCISE 23X17" 6650EZ

## (undated) DEVICE — PACK TOTAL HIP W/U DRAPE SOP15HUFSC

## (undated) DEVICE — BONE CLEANING TIP INTERPULSE  0210-010-000

## (undated) DEVICE — ESU GROUND PAD UNIVERSAL W/O CORD

## (undated) DEVICE — BLADE SAW SAGITTAL STRK 25X79.5X1.24MM 4/2000 2108-318-000

## (undated) DEVICE — MANIFOLD NEPTUNE 4 PORT 700-20

## (undated) DEVICE — SU VICRYL 1 CTX 36" J371H

## (undated) DEVICE — IMPLANTABLE DEVICE: Type: IMPLANTABLE DEVICE | Site: HIP | Status: NON-FUNCTIONAL

## (undated) DEVICE — DRAPE CONVERTORS U-DRAPE 60X72" 8476

## (undated) DEVICE — GLOVE SENSICARE PI ORTHO PF 7.0 LATEX FREE MSG9470

## (undated) RX ORDER — PROPOFOL 10 MG/ML
INJECTION, EMULSION INTRAVENOUS
Status: DISPENSED
Start: 2023-04-26

## (undated) RX ORDER — FENTANYL CITRATE 0.05 MG/ML
INJECTION, SOLUTION INTRAMUSCULAR; INTRAVENOUS
Status: DISPENSED
Start: 2023-04-26

## (undated) RX ORDER — ONDANSETRON 2 MG/ML
INJECTION INTRAMUSCULAR; INTRAVENOUS
Status: DISPENSED
Start: 2023-04-26

## (undated) RX ORDER — ATENOLOL 25 MG/1
TABLET ORAL
Status: DISPENSED
Start: 2023-04-26

## (undated) RX ORDER — CEFAZOLIN SODIUM/WATER 2 G/20 ML
SYRINGE (ML) INTRAVENOUS
Status: DISPENSED
Start: 2023-04-26

## (undated) RX ORDER — HYDROMORPHONE HYDROCHLORIDE 1 MG/ML
INJECTION, SOLUTION INTRAMUSCULAR; INTRAVENOUS; SUBCUTANEOUS
Status: DISPENSED
Start: 2023-04-26

## (undated) RX ORDER — NEOSTIGMINE METHYLSULFATE 1 MG/ML
VIAL (ML) INJECTION
Status: DISPENSED
Start: 2023-04-26

## (undated) RX ORDER — DEXAMETHASONE SODIUM PHOSPHATE 4 MG/ML
INJECTION, SOLUTION INTRA-ARTICULAR; INTRALESIONAL; INTRAMUSCULAR; INTRAVENOUS; SOFT TISSUE
Status: DISPENSED
Start: 2023-04-26

## (undated) RX ORDER — TRANEXAMIC ACID 650 MG/1
TABLET ORAL
Status: DISPENSED
Start: 2023-04-26

## (undated) RX ORDER — VANCOMYCIN HYDROCHLORIDE 1 G/20ML
INJECTION, POWDER, LYOPHILIZED, FOR SOLUTION INTRAVENOUS
Status: DISPENSED
Start: 2023-04-26

## (undated) RX ORDER — GLYCOPYRROLATE 0.2 MG/ML
INJECTION, SOLUTION INTRAMUSCULAR; INTRAVENOUS
Status: DISPENSED
Start: 2023-04-26

## (undated) RX ORDER — FENTANYL CITRATE 50 UG/ML
INJECTION, SOLUTION INTRAMUSCULAR; INTRAVENOUS
Status: DISPENSED
Start: 2023-04-26